# Patient Record
Sex: FEMALE | Race: OTHER | NOT HISPANIC OR LATINO
[De-identification: names, ages, dates, MRNs, and addresses within clinical notes are randomized per-mention and may not be internally consistent; named-entity substitution may affect disease eponyms.]

---

## 2018-04-17 ENCOUNTER — TRANSCRIPTION ENCOUNTER (OUTPATIENT)
Age: 73
End: 2018-04-17

## 2018-06-18 ENCOUNTER — APPOINTMENT (OUTPATIENT)
Dept: ORTHOPEDIC SURGERY | Facility: CLINIC | Age: 73
End: 2018-06-18
Payer: MEDICARE

## 2018-06-18 VITALS
HEIGHT: 62 IN | SYSTOLIC BLOOD PRESSURE: 150 MMHG | DIASTOLIC BLOOD PRESSURE: 82 MMHG | HEART RATE: 76 BPM | BODY MASS INDEX: 34.23 KG/M2 | WEIGHT: 186 LBS

## 2018-06-18 DIAGNOSIS — Z87.39 PERSONAL HISTORY OF OTHER DISEASES OF THE MUSCULOSKELETAL SYSTEM AND CONNECTIVE TISSUE: ICD-10-CM

## 2018-06-18 DIAGNOSIS — M47.812 SPONDYLOSIS W/OUT MYELOPATHY OR RADICULOPATHY, CERVICAL REGION: ICD-10-CM

## 2018-06-18 DIAGNOSIS — Z87.891 PERSONAL HISTORY OF NICOTINE DEPENDENCE: ICD-10-CM

## 2018-06-18 PROCEDURE — 99204 OFFICE O/P NEW MOD 45 MIN: CPT

## 2018-09-07 ENCOUNTER — EMERGENCY (EMERGENCY)
Facility: HOSPITAL | Age: 73
LOS: 1 days | Discharge: HOME | End: 2018-09-07
Attending: EMERGENCY MEDICINE
Payer: MEDICARE

## 2018-09-07 VITALS
HEART RATE: 71 BPM | DIASTOLIC BLOOD PRESSURE: 68 MMHG | RESPIRATION RATE: 16 BRPM | TEMPERATURE: 98 F | OXYGEN SATURATION: 99 % | WEIGHT: 184.09 LBS | SYSTOLIC BLOOD PRESSURE: 130 MMHG

## 2018-09-07 PROCEDURE — 73630 X-RAY EXAM OF FOOT: CPT

## 2018-09-07 PROCEDURE — 99284 EMERGENCY DEPT VISIT MOD MDM: CPT | Mod: 25

## 2018-09-07 PROCEDURE — 73562 X-RAY EXAM OF KNEE 3: CPT

## 2018-09-07 PROCEDURE — 73562 X-RAY EXAM OF KNEE 3: CPT | Mod: 26,RT

## 2018-09-07 PROCEDURE — 29515 APPLICATION SHORT LEG SPLINT: CPT | Mod: RT

## 2018-09-07 PROCEDURE — 29515 APPLICATION SHORT LEG SPLINT: CPT

## 2018-09-07 PROCEDURE — 73630 X-RAY EXAM OF FOOT: CPT | Mod: 26,RT

## 2018-09-07 PROCEDURE — 73590 X-RAY EXAM OF LOWER LEG: CPT | Mod: 26,RT

## 2018-09-07 PROCEDURE — 73610 X-RAY EXAM OF ANKLE: CPT | Mod: 26,RT

## 2018-09-07 PROCEDURE — 73590 X-RAY EXAM OF LOWER LEG: CPT

## 2018-09-07 PROCEDURE — 73610 X-RAY EXAM OF ANKLE: CPT

## 2018-09-07 PROCEDURE — 99283 EMERGENCY DEPT VISIT LOW MDM: CPT | Mod: 25

## 2018-09-07 RX ORDER — OXYCODONE AND ACETAMINOPHEN 5; 325 MG/1; MG/1
1 TABLET ORAL ONCE
Qty: 0 | Refills: 0 | Status: DISCONTINUED | OUTPATIENT
Start: 2018-09-07 | End: 2018-09-07

## 2018-09-07 RX ADMIN — OXYCODONE AND ACETAMINOPHEN 1 TABLET(S): 5; 325 TABLET ORAL at 16:08

## 2018-09-07 NOTE — ED PROVIDER NOTE - CARE PLAN
Principal Discharge DX:	Fibula fracture  Assessment and plan of treatment:	Return to the ED for any new or worsening symptoms  Take your medication as prescribed  Motrin per label instructions as needed for pain   Percocet per label instructions as needed for severe pain do not drive when taking   Keep splint in place until cleared by orthopedics   Elevate leg to reduce swelling   Non weight bearing until cleared by orthopedics   Ice to affected ankle on 20 min off 40 min as needed for pain and swelling   Call the orthopedist on Monday to schedule follow up  Secondary Diagnosis:	Calcaneal spur  Secondary Diagnosis:	Ankle pain, right

## 2018-09-07 NOTE — ED PROVIDER NOTE - OBJECTIVE STATEMENT
Pt is a 74 yo female who presents to the ED with a cc of right foot pain.  PMHx of HTN, HLD.   Pt reports that today while walking outside she suffered a mechanical fall twisting her right ankle and landing on her right knee.  Pt denies striking her head and denies LOC.  She is not on blood thinners.  Was able to stand and bear weight but with severe pain to her right ankle.  Denies numbness or tingling in her RLE.  Pt does report previous right ankle fracture.  Denies HA, visual changes, N/V, CP, SOB, abd pain.  Denies neck and back pain.  Denies loss of bowel or bladder function.

## 2018-09-07 NOTE — ED ADULT NURSE NOTE - NSIMPLEMENTINTERV_GEN_ALL_ED
Implemented All Universal Safety Interventions:  [place] to call system. Call bell, personal items and telephone within reach. Instruct patient to call for assistance. Room bathroom lighting operational. Non-slip footwear when patient is off stretcher. Physically safe environment: no spills, clutter or unnecessary equipment. Stretcher in lowest position, wheels locked, appropriate side rails in place. Implemented All Fall Risk Interventions:  [place] to call system. Call bell, personal items and telephone within reach. Instruct patient to call for assistance. Room bathroom lighting operational. Non-slip footwear when patient is off stretcher. Physically safe environment: no spills, clutter or unnecessary equipment. Stretcher in lowest position, wheels locked, appropriate side rails in place. Provide visual cue, wrist band, yellow gown, etc. Monitor gait and stability. Monitor for mental status changes and reorient to person, place, and time. Review medications for side effects contributing to fall risk. Reinforce activity limits and safety measures with patient and family.

## 2018-09-07 NOTE — ED PROVIDER NOTE - PROGRESS NOTE DETAILS
Results of images reviewed, copy provided, all questions answered.  Pt to follow up with orthopedics as an outpatient

## 2018-09-07 NOTE — ED PROVIDER NOTE - PLAN OF CARE
Return to the ED for any new or worsening symptoms  Take your medication as prescribed  Motrin per label instructions as needed for pain   Percocet per label instructions as needed for severe pain do not drive when taking   Keep splint in place until cleared by orthopedics   Elevate leg to reduce swelling   Non weight bearing until cleared by orthopedics   Ice to affected ankle on 20 min off 40 min as needed for pain and swelling   Call the orthopedist on Monday to schedule follow up

## 2018-09-07 NOTE — ED ADULT NURSE NOTE - OBJECTIVE STATEMENT
Pt is 73y F, came to ED s/p mechanical fall today, she states she tripped over steps and injured her Right knee and Ankle, denies any numbness/tingling, no deformity noted, +pedal pulses noted, pt denies hitting head, has no other complaints, advised on plan of care, verbalized understanding, she reports pain on weight bearing to the Right LE.

## 2018-09-07 NOTE — ED PROVIDER NOTE - MUSCULOSKELETAL, MLM
Diffuse TTP to right ankle with swelling noted to medial and lateral malleolus no TTP to MTPs, +pedal pulse, cap refill less then 2 seconds, no TTP to right knee, FROM sensation intact, no significant ligament instability noted, sensation grossly intact to leg

## 2020-04-29 ENCOUNTER — TRANSCRIPTION ENCOUNTER (OUTPATIENT)
Age: 75
End: 2020-04-29

## 2021-02-25 PROBLEM — I10 ESSENTIAL (PRIMARY) HYPERTENSION: Chronic | Status: ACTIVE | Noted: 2018-09-07

## 2021-02-25 PROBLEM — E78.5 HYPERLIPIDEMIA, UNSPECIFIED: Chronic | Status: ACTIVE | Noted: 2018-09-07

## 2021-04-27 ENCOUNTER — APPOINTMENT (OUTPATIENT)
Dept: OBGYN | Facility: CLINIC | Age: 76
End: 2021-04-27
Payer: MEDICARE

## 2021-04-27 VITALS
HEIGHT: 62 IN | BODY MASS INDEX: 34.6 KG/M2 | WEIGHT: 188 LBS | SYSTOLIC BLOOD PRESSURE: 126 MMHG | DIASTOLIC BLOOD PRESSURE: 82 MMHG

## 2021-04-27 PROCEDURE — 77080 DXA BONE DENSITY AXIAL: CPT

## 2021-04-27 PROCEDURE — 99214 OFFICE O/P EST MOD 30 MIN: CPT | Mod: 25

## 2021-04-27 PROCEDURE — G0101: CPT

## 2021-04-27 PROCEDURE — 82274 ASSAY TEST FOR BLOOD FECAL: CPT | Mod: QW

## 2021-04-28 LAB — HPV HIGH+LOW RISK DNA PNL CVX: NOT DETECTED

## 2021-05-02 LAB — CYTOLOGY CVX/VAG DOC THIN PREP: ABNORMAL

## 2021-05-21 ENCOUNTER — RESULT REVIEW (OUTPATIENT)
Age: 76
End: 2021-05-21

## 2021-05-21 ENCOUNTER — APPOINTMENT (OUTPATIENT)
Dept: ULTRASOUND IMAGING | Facility: CLINIC | Age: 76
End: 2021-05-21
Payer: MEDICARE

## 2021-05-21 ENCOUNTER — APPOINTMENT (OUTPATIENT)
Dept: MAMMOGRAPHY | Facility: CLINIC | Age: 76
End: 2021-05-21
Payer: MEDICARE

## 2021-05-21 PROCEDURE — 77063 BREAST TOMOSYNTHESIS BI: CPT

## 2021-05-21 PROCEDURE — 76641 ULTRASOUND BREAST COMPLETE: CPT | Mod: 50

## 2021-05-21 PROCEDURE — 77067 SCR MAMMO BI INCL CAD: CPT

## 2021-05-31 ENCOUNTER — APPOINTMENT (OUTPATIENT)
Dept: MAMMOGRAPHY | Facility: CLINIC | Age: 76
End: 2021-05-31
Payer: MEDICARE

## 2021-05-31 ENCOUNTER — RESULT REVIEW (OUTPATIENT)
Age: 76
End: 2021-05-31

## 2021-05-31 ENCOUNTER — APPOINTMENT (OUTPATIENT)
Dept: ULTRASOUND IMAGING | Facility: CLINIC | Age: 76
End: 2021-05-31
Payer: MEDICARE

## 2021-05-31 PROCEDURE — 76642 ULTRASOUND BREAST LIMITED: CPT | Mod: LT

## 2021-05-31 PROCEDURE — G0279: CPT | Mod: LT

## 2021-05-31 PROCEDURE — 77065 DX MAMMO INCL CAD UNI: CPT | Mod: LT

## 2021-06-08 DIAGNOSIS — N64.59 OTHER SIGNS AND SYMPTOMS IN BREAST: ICD-10-CM

## 2021-11-16 ENCOUNTER — RESULT REVIEW (OUTPATIENT)
Age: 76
End: 2021-11-16

## 2021-11-16 ENCOUNTER — APPOINTMENT (OUTPATIENT)
Dept: ULTRASOUND IMAGING | Facility: CLINIC | Age: 76
End: 2021-11-16

## 2021-11-16 ENCOUNTER — APPOINTMENT (OUTPATIENT)
Dept: MAMMOGRAPHY | Facility: CLINIC | Age: 76
End: 2021-11-16
Payer: MEDICARE

## 2021-11-16 PROCEDURE — 76642 ULTRASOUND BREAST LIMITED: CPT | Mod: LT

## 2021-11-16 PROCEDURE — 77065 DX MAMMO INCL CAD UNI: CPT | Mod: LT

## 2021-11-16 PROCEDURE — G0279: CPT

## 2021-11-17 ENCOUNTER — NON-APPOINTMENT (OUTPATIENT)
Age: 76
End: 2021-11-17

## 2021-11-17 ENCOUNTER — RESULT REVIEW (OUTPATIENT)
Age: 76
End: 2021-11-17

## 2021-11-19 ENCOUNTER — APPOINTMENT (OUTPATIENT)
Dept: ULTRASOUND IMAGING | Facility: CLINIC | Age: 76
End: 2021-11-19
Payer: MEDICARE

## 2021-11-19 ENCOUNTER — RESULT REVIEW (OUTPATIENT)
Age: 76
End: 2021-11-19

## 2021-11-19 ENCOUNTER — APPOINTMENT (OUTPATIENT)
Dept: MAMMOGRAPHY | Facility: CLINIC | Age: 76
End: 2021-11-19
Payer: MEDICARE

## 2021-11-19 PROCEDURE — 19083 BX BREAST 1ST LESION US IMAG: CPT | Mod: LT

## 2021-11-19 PROCEDURE — 77065 DX MAMMO INCL CAD UNI: CPT | Mod: LT

## 2021-11-24 ENCOUNTER — NON-APPOINTMENT (OUTPATIENT)
Age: 76
End: 2021-11-24

## 2021-11-29 ENCOUNTER — NON-APPOINTMENT (OUTPATIENT)
Age: 76
End: 2021-11-29

## 2021-11-30 ENCOUNTER — NON-APPOINTMENT (OUTPATIENT)
Age: 76
End: 2021-11-30

## 2021-11-30 ENCOUNTER — APPOINTMENT (OUTPATIENT)
Dept: SURGICAL ONCOLOGY | Facility: CLINIC | Age: 76
End: 2021-11-30
Payer: MEDICARE

## 2021-11-30 VITALS
TEMPERATURE: 97.6 F | HEIGHT: 62 IN | RESPIRATION RATE: 15 BRPM | SYSTOLIC BLOOD PRESSURE: 138 MMHG | DIASTOLIC BLOOD PRESSURE: 87 MMHG | HEART RATE: 78 BPM | BODY MASS INDEX: 33.49 KG/M2 | WEIGHT: 182 LBS | OXYGEN SATURATION: 95 %

## 2021-11-30 PROCEDURE — 99204 OFFICE O/P NEW MOD 45 MIN: CPT

## 2021-11-30 NOTE — PHYSICAL EXAM
[Normal] : supple, no neck mass and thyroid not enlarged [Normal Supraclavicular Lymph Nodes] : normal supraclavicular lymph nodes [Normal Axillary Lymph Nodes] : normal axillary lymph nodes [Normal] : oriented to person, place and time, with appropriate affect [FreeTextEntry1] : AB present during exam  [de-identified] : Normal S1, S2.  Regular rate and rhythm.  [de-identified] : Complete breast exam performed in supine and upright positions.  No palpable masses, tenderness, nipple discharge, inversion, deviation or enlarged axillary or supraclavicular lymph nodes bilaterally.  [de-identified] : Clear breath sounds bilaterally, normal respiratory effort.

## 2021-11-30 NOTE — HISTORY OF PRESENT ILLNESS
[de-identified] : 76 year female presents for an established patient consultation.  She has a prior history of a benign excisional biopsy of the left breast in 2013, right breast in 1999 and an additional excisional biopsy with another surgeon at an unspecified time.  Pathology demonstrated atypia but she has never been diagnosed with cancer.  \par \par She completed an annual mammogram and sonogram in May 2021.  A small asymmetry was seen in the left breast for which additional views are recommended.  There are questionable small nodules in the left breast on ultrasound for which a targeted ultrasound was recommended (BIRADS 0).  Subsequent left-sided mammogram and sonogram performed 5/31/21 revealed a persistent small nodular asymmetry in the left breast on mammogram likely corresponding to a probable cyst on ultrasound.  Follow up diagnostic left breast mammogram and sonogram in 6 months are recommended.  There is an additional small nodule in the upper left breast on mammogram with no sonogram correlate- 6 month follow up diagnostic mammogram recommended.  There are probably benign findings on ultrasound in the left breast for which 6 month follow up left breast ultrasound is recommended (BIRADS 3). \par \par Left-sided mammogram and sonogram was performed in November 2021.  There is a 3 mm cyst vs.hypoechoic nodule in the left breast at 9:00, 6 cmfn, corresponding to a stable circumscribed nodule on mammogram.  This appears somewhat irregular on sonography and ultrasound-guided biopsy is recommended (BIRADS 4b).\par \par Biopsy was performed on 11/19/21.  Pathology demonstrated invasive ductal carcinoma, ER/MA/HER2 status pending.\par \par Her past medical history includes hypertension, hypothyroidism and hyperlipidemia.  She is currently undergoing a work up with endocrinology because she believes she is having hormonal issues.  Past surgical history includes a bilateral oophorectomy for a benign condition) and a D&C.  She has a family history of breast cancer involving a maternal first cousin at age 62.  \par \par She denies any palpable breast mass, nipple discharge, nipple inversion or skin changes. \par \par Ob/gyn: Dr. Tia Mckeon

## 2021-11-30 NOTE — ASSESSMENT
[FreeTextEntry1] : IMP:\par Newly diagnosed invasive ductal carcinoma of the left breast at 9:00, 6 cmfn (ER/AR/HER2 pending).\par \par PLAN:\par The patient and I discussed the surgical management of breast cancer. I explained that breast cancer can be treated with 2 main surgical approaches. One is breast conserving therapy. The other is mastectomy with or without immediate reconstruction by a plastic surgeon. Breast conserving therapy involves wide excision of the involved area. Negative margins must be achieved with this wide excision. In addition, evaluation of the lymph nodes either with a sentinel lymph node biopsy or an axillary lymph node dissection may be required. This treatment usually requires postoperative radiation to the breast. Treatment with mastectomy also involves evaluation of the lymph node with a sentinel lymph node biopsy or axillary lymph node dissection. Post operative radiation therapy may be needed even after mastectomy in certain advanced cases.\par \par Breast MRI\par

## 2021-12-02 ENCOUNTER — OUTPATIENT (OUTPATIENT)
Dept: OUTPATIENT SERVICES | Facility: HOSPITAL | Age: 76
LOS: 1 days | End: 2021-12-02
Payer: MEDICARE

## 2021-12-02 ENCOUNTER — APPOINTMENT (OUTPATIENT)
Dept: MRI IMAGING | Facility: IMAGING CENTER | Age: 76
End: 2021-12-02
Payer: MEDICARE

## 2021-12-02 DIAGNOSIS — C50.912 MALIGNANT NEOPLASM OF UNSPECIFIED SITE OF LEFT FEMALE BREAST: ICD-10-CM

## 2021-12-02 PROCEDURE — G1004: CPT

## 2021-12-02 PROCEDURE — 77049 MRI BREAST C-+ W/CAD BI: CPT | Mod: 26,MG

## 2021-12-02 PROCEDURE — C8908: CPT | Mod: MG

## 2021-12-02 PROCEDURE — A9585: CPT

## 2021-12-02 PROCEDURE — C8937: CPT

## 2021-12-09 ENCOUNTER — APPOINTMENT (OUTPATIENT)
Dept: ULTRASOUND IMAGING | Facility: CLINIC | Age: 76
End: 2021-12-09
Payer: MEDICARE

## 2021-12-09 ENCOUNTER — OUTPATIENT (OUTPATIENT)
Dept: OUTPATIENT SERVICES | Facility: HOSPITAL | Age: 76
LOS: 1 days | End: 2021-12-09
Payer: MEDICARE

## 2021-12-09 ENCOUNTER — RESULT REVIEW (OUTPATIENT)
Age: 76
End: 2021-12-09

## 2021-12-09 DIAGNOSIS — N64.59 OTHER SIGNS AND SYMPTOMS IN BREAST: ICD-10-CM

## 2021-12-09 PROCEDURE — 76641 ULTRASOUND BREAST COMPLETE: CPT | Mod: 26,50

## 2021-12-09 PROCEDURE — 19084 BX BREAST ADD LESION US IMAG: CPT | Mod: RT

## 2021-12-09 PROCEDURE — 76641 ULTRASOUND BREAST COMPLETE: CPT

## 2021-12-09 PROCEDURE — 77066 DX MAMMO INCL CAD BI: CPT | Mod: 26

## 2021-12-09 PROCEDURE — A4648: CPT

## 2021-12-09 PROCEDURE — 19083 BX BREAST 1ST LESION US IMAG: CPT | Mod: LT

## 2021-12-09 PROCEDURE — 88305 TISSUE EXAM BY PATHOLOGIST: CPT | Mod: 26

## 2021-12-09 PROCEDURE — 19083 BX BREAST 1ST LESION US IMAG: CPT

## 2021-12-09 PROCEDURE — 19084 BX BREAST ADD LESION US IMAG: CPT

## 2021-12-09 PROCEDURE — 77066 DX MAMMO INCL CAD BI: CPT

## 2021-12-09 PROCEDURE — 88305 TISSUE EXAM BY PATHOLOGIST: CPT

## 2021-12-14 LAB — SURGICAL PATHOLOGY STUDY: SIGNIFICANT CHANGE UP

## 2021-12-15 ENCOUNTER — APPOINTMENT (OUTPATIENT)
Dept: ULTRASOUND IMAGING | Facility: HOSPITAL | Age: 76
End: 2021-12-15

## 2022-01-03 ENCOUNTER — OUTPATIENT (OUTPATIENT)
Dept: OUTPATIENT SERVICES | Facility: HOSPITAL | Age: 77
LOS: 1 days | End: 2022-01-03
Payer: MEDICARE

## 2022-01-03 VITALS
HEIGHT: 62.5 IN | RESPIRATION RATE: 16 BRPM | OXYGEN SATURATION: 97 % | SYSTOLIC BLOOD PRESSURE: 133 MMHG | HEART RATE: 77 BPM | WEIGHT: 186.07 LBS | DIASTOLIC BLOOD PRESSURE: 85 MMHG | TEMPERATURE: 97 F

## 2022-01-03 DIAGNOSIS — I10 ESSENTIAL (PRIMARY) HYPERTENSION: ICD-10-CM

## 2022-01-03 DIAGNOSIS — C50.912 MALIGNANT NEOPLASM OF UNSPECIFIED SITE OF LEFT FEMALE BREAST: ICD-10-CM

## 2022-01-03 DIAGNOSIS — C50.919 MALIGNANT NEOPLASM OF UNSPECIFIED SITE OF UNSPECIFIED FEMALE BREAST: ICD-10-CM

## 2022-01-03 LAB
ALBUMIN SERPL ELPH-MCNC: 4.3 G/DL — SIGNIFICANT CHANGE UP (ref 3.3–5)
ALP SERPL-CCNC: 49 U/L — SIGNIFICANT CHANGE UP (ref 40–120)
ALT FLD-CCNC: 19 U/L — SIGNIFICANT CHANGE UP (ref 4–33)
ANION GAP SERPL CALC-SCNC: 10 MMOL/L — SIGNIFICANT CHANGE UP (ref 7–14)
AST SERPL-CCNC: 17 U/L — SIGNIFICANT CHANGE UP (ref 4–32)
BILIRUB SERPL-MCNC: 0.2 MG/DL — SIGNIFICANT CHANGE UP (ref 0.2–1.2)
BUN SERPL-MCNC: 30 MG/DL — HIGH (ref 7–23)
CALCIUM SERPL-MCNC: 9.1 MG/DL — SIGNIFICANT CHANGE UP (ref 8.4–10.5)
CHLORIDE SERPL-SCNC: 103 MMOL/L — SIGNIFICANT CHANGE UP (ref 98–107)
CO2 SERPL-SCNC: 26 MMOL/L — SIGNIFICANT CHANGE UP (ref 22–31)
CREAT SERPL-MCNC: 0.8 MG/DL — SIGNIFICANT CHANGE UP (ref 0.5–1.3)
GLUCOSE SERPL-MCNC: 76 MG/DL — SIGNIFICANT CHANGE UP (ref 70–99)
HCT VFR BLD CALC: 38.1 % — SIGNIFICANT CHANGE UP (ref 34.5–45)
HGB BLD-MCNC: 11.8 G/DL — SIGNIFICANT CHANGE UP (ref 11.5–15.5)
MCHC RBC-ENTMCNC: 28.5 PG — SIGNIFICANT CHANGE UP (ref 27–34)
MCHC RBC-ENTMCNC: 31 GM/DL — LOW (ref 32–36)
MCV RBC AUTO: 92 FL — SIGNIFICANT CHANGE UP (ref 80–100)
NRBC # BLD: 0 /100 WBCS — SIGNIFICANT CHANGE UP
NRBC # FLD: 0 K/UL — SIGNIFICANT CHANGE UP
PLATELET # BLD AUTO: 302 K/UL — SIGNIFICANT CHANGE UP (ref 150–400)
POTASSIUM SERPL-MCNC: 4.3 MMOL/L — SIGNIFICANT CHANGE UP (ref 3.5–5.3)
POTASSIUM SERPL-SCNC: 4.3 MMOL/L — SIGNIFICANT CHANGE UP (ref 3.5–5.3)
PROT SERPL-MCNC: 7 G/DL — SIGNIFICANT CHANGE UP (ref 6–8.3)
RBC # BLD: 4.14 M/UL — SIGNIFICANT CHANGE UP (ref 3.8–5.2)
RBC # FLD: 13.5 % — SIGNIFICANT CHANGE UP (ref 10.3–14.5)
SODIUM SERPL-SCNC: 139 MMOL/L — SIGNIFICANT CHANGE UP (ref 135–145)
WBC # BLD: 6.2 K/UL — SIGNIFICANT CHANGE UP (ref 3.8–10.5)
WBC # FLD AUTO: 6.2 K/UL — SIGNIFICANT CHANGE UP (ref 3.8–10.5)

## 2022-01-03 PROCEDURE — 93010 ELECTROCARDIOGRAM REPORT: CPT

## 2022-01-03 RX ORDER — GABAPENTIN 400 MG/1
0 CAPSULE ORAL
Qty: 0 | Refills: 0 | DISCHARGE

## 2022-01-03 RX ORDER — ATORVASTATIN CALCIUM 80 MG/1
0 TABLET, FILM COATED ORAL
Qty: 0 | Refills: 0 | DISCHARGE

## 2022-01-03 NOTE — H&P PST ADULT - ASSESSMENT
77 y/o female with hx of left breast abnormality discovered on imaging, malignant on  biopsy.  scheduled forLeft breast lumpectomy, post magseed localization left axillary sentinel lymph node biopsy.

## 2022-01-03 NOTE — H&P PST ADULT - PROBLEM SELECTOR PLAN 1
Left breast lumpectomy, post magseed localization left axillary sentinel lymph node biopsy.    CBC CMP EKG    Preop instructions and antibacterial soap given and explained (verbal and written), with teach back.    Pt to see PMD for pre-op eval (comorbidities, age), requested on chart

## 2022-01-03 NOTE — H&P PST ADULT - HISTORY OF PRESENT ILLNESS
75 y/o female with hx of left breast abnormality discovered on imaging, malignant on  biopsy.  scheduled forLeft breast lumpectomy, post magseed localization left axillary sentinel lymph node biopsy.

## 2022-01-03 NOTE — H&P PST ADULT - NSICDXPASTMEDICALHX_GEN_ALL_CORE_FT
PAST MEDICAL HISTORY:  Hyperlipidemia     Hypertension     Hypothyroidism      PAST MEDICAL HISTORY:  Hyperlipidemia     Hypertension     Hypothyroidism     Obesity

## 2022-01-04 PROBLEM — E03.9 HYPOTHYROIDISM, UNSPECIFIED: Chronic | Status: ACTIVE | Noted: 2022-01-03

## 2022-01-04 PROBLEM — E66.9 OBESITY, UNSPECIFIED: Chronic | Status: ACTIVE | Noted: 2022-01-03

## 2022-01-11 ENCOUNTER — RESULT REVIEW (OUTPATIENT)
Age: 77
End: 2022-01-11

## 2022-01-11 ENCOUNTER — OUTPATIENT (OUTPATIENT)
Dept: OUTPATIENT SERVICES | Facility: HOSPITAL | Age: 77
LOS: 1 days | End: 2022-01-11
Payer: MEDICARE

## 2022-01-11 ENCOUNTER — APPOINTMENT (OUTPATIENT)
Dept: MAMMOGRAPHY | Facility: IMAGING CENTER | Age: 77
End: 2022-01-11
Payer: MEDICARE

## 2022-01-11 DIAGNOSIS — Z00.8 ENCOUNTER FOR OTHER GENERAL EXAMINATION: ICD-10-CM

## 2022-01-11 PROCEDURE — 19281 PERQ DEVICE BREAST 1ST IMAG: CPT | Mod: LT

## 2022-01-11 PROCEDURE — 19281 PERQ DEVICE BREAST 1ST IMAG: CPT

## 2022-01-11 PROCEDURE — C1739: CPT

## 2022-01-12 ENCOUNTER — TRANSCRIPTION ENCOUNTER (OUTPATIENT)
Age: 77
End: 2022-01-12

## 2022-01-12 VITALS
RESPIRATION RATE: 16 BRPM | HEIGHT: 62.5 IN | TEMPERATURE: 97 F | WEIGHT: 186.07 LBS | HEART RATE: 65 BPM | DIASTOLIC BLOOD PRESSURE: 83 MMHG | SYSTOLIC BLOOD PRESSURE: 123 MMHG | OXYGEN SATURATION: 97 %

## 2022-01-12 NOTE — ASU PREOPERATIVE ASSESSMENT, ADULT ([place] ONLY) - FALL HARM RISK - UNIVERSAL INTERVENTIONS
Bed in lowest position, wheels locked, appropriate side rails in place/Call bell, personal items and telephone in reach/Instruct patient to call for assistance before getting out of bed or chair/Non-slip footwear when patient is out of bed/[place] to call system/Physically safe environment - no spills, clutter or unnecessary equipment/Purposeful Proactive Rounding/Room/bathroom lighting operational, light cord in reach

## 2022-01-13 ENCOUNTER — TRANSCRIPTION ENCOUNTER (OUTPATIENT)
Age: 77
End: 2022-01-13

## 2022-01-13 ENCOUNTER — OUTPATIENT (OUTPATIENT)
Dept: OUTPATIENT SERVICES | Facility: HOSPITAL | Age: 77
LOS: 1 days | End: 2022-01-13
Payer: COMMERCIAL

## 2022-01-13 ENCOUNTER — NON-APPOINTMENT (OUTPATIENT)
Age: 77
End: 2022-01-13

## 2022-01-13 ENCOUNTER — RESULT REVIEW (OUTPATIENT)
Age: 77
End: 2022-01-13

## 2022-01-13 ENCOUNTER — APPOINTMENT (OUTPATIENT)
Dept: SURGICAL ONCOLOGY | Facility: AMBULATORY SURGERY CENTER | Age: 77
End: 2022-01-13

## 2022-01-13 ENCOUNTER — OUTPATIENT (OUTPATIENT)
Dept: OUTPATIENT SERVICES | Facility: HOSPITAL | Age: 77
LOS: 1 days | Discharge: HOME | End: 2022-01-13
Payer: MEDICARE

## 2022-01-13 ENCOUNTER — APPOINTMENT (OUTPATIENT)
Dept: NUCLEAR MEDICINE | Facility: IMAGING CENTER | Age: 77
End: 2022-01-13
Payer: MEDICARE

## 2022-01-13 ENCOUNTER — APPOINTMENT (OUTPATIENT)
Dept: MAMMOGRAPHY | Facility: IMAGING CENTER | Age: 77
End: 2022-01-13
Payer: MEDICARE

## 2022-01-13 VITALS
HEART RATE: 88 BPM | SYSTOLIC BLOOD PRESSURE: 129 MMHG | RESPIRATION RATE: 16 BRPM | DIASTOLIC BLOOD PRESSURE: 63 MMHG | TEMPERATURE: 98 F | OXYGEN SATURATION: 100 %

## 2022-01-13 DIAGNOSIS — C50.912 MALIGNANT NEOPLASM OF UNSPECIFIED SITE OF LEFT FEMALE BREAST: ICD-10-CM

## 2022-01-13 DIAGNOSIS — Z00.8 ENCOUNTER FOR OTHER GENERAL EXAMINATION: ICD-10-CM

## 2022-01-13 PROCEDURE — 76098 X-RAY EXAM SURGICAL SPECIMEN: CPT | Mod: 26

## 2022-01-13 PROCEDURE — 88305 TISSUE EXAM BY PATHOLOGIST: CPT | Mod: 26

## 2022-01-13 PROCEDURE — 76098 X-RAY EXAM SURGICAL SPECIMEN: CPT

## 2022-01-13 PROCEDURE — 88307 TISSUE EXAM BY PATHOLOGIST: CPT | Mod: 26

## 2022-01-13 PROCEDURE — 38790 INJECT FOR LYMPHATIC X-RAY: CPT | Mod: LT

## 2022-01-13 PROCEDURE — 88342 IMHCHEM/IMCYTCHM 1ST ANTB: CPT | Mod: 26

## 2022-01-13 PROCEDURE — 19302 P-MASTECTOMY W/LN REMOVAL: CPT | Mod: LT

## 2022-01-13 PROCEDURE — 88341 IMHCHEM/IMCYTCHM EA ADD ANTB: CPT | Mod: 26

## 2022-01-13 NOTE — ASU DISCHARGE PLAN (ADULT/PEDIATRIC) - CARE PROVIDER_API CALL
Ant Becerril)  Surgery  [address] [place] [address] [id number]  Phone: [phone]  Fax: [phone]  Follow Up Time: 2 weeks

## 2022-01-13 NOTE — ASU DISCHARGE PLAN (ADULT/PEDIATRIC) - NS MD DC FALL RISK RISK
For information on Fall & Injury Prevention, visit: [email or web address] OR  [email or web address] OR  https://www.cdc.gov/steadi/patient.html

## 2022-01-13 NOTE — BRIEF OPERATIVE NOTE - OPERATION/FINDINGS
Left breast lumpectomy with mag seed localization. Specimen with mag seed and clip confirmed by radiology. Additional specimens sent. [place] lymph node biopsy using blue dye, one node removed.

## 2022-01-13 NOTE — ASU DISCHARGE PLAN (ADULT/PEDIATRIC) - ASU DC SPECIAL INSTRUCTIONSFT
[facility]  CANCER  I  N  S  T  I  T  U  T E     Department of Surgery  Division of Surgical Oncology    Rohan Anderson M.D., NICOLE.JUVENTINO.  Chief, Division of Surgical Oncology    Rishi Ivy M.D., F.A.C.S., F.A.S.JAC.  Associate , Department of Surgery    Blake Win M.D., F.A.C.S.  Mike Bradshaw M.D., F.A.C.S.  Ulysses Jones M.D., F.A.C.S.  Mauro Jacobson M.D., F.A.C.S.  Mike Torres M.D., F.A.C.S.  Ant Becerril M.D., F.MARIANC.S.      Breast Biopsy/Lumpectomy Post-operative Instructions  1.	Supportive bra- Please bring a sports/athletic bra with you on the day of your surgery.  You will wear it home from the hospital.  You should wear the supportive bra continuously for 48 hours after the procedure, including wearing it to sleep.  Therefore, you may wear your regular bra.  You may remove the bra to shower.  The sports bra will provide support, decrease the amount of swelling at the biopsy site, and make your recovery more comfortable.    2.	Wound dressing- There is a dressing on the wound, which consists of 2 layers.  The outer layer is a clear plastic dressing (called Tegaderm) which is waterproof.  This should remain in place for 2 days post-operatively.  On the 2nd post-operative day, you should remove the clear plastic Tegaderm dressing.  Underneath the Tegaderm dressing, there are white surgical tapes (called steri strips) which are directly adherent to the skin.  These should remain on the skin, and will peel off naturally.  All of the stitches are “internal” and will dissolve naturally.    3.	Showering/Bathing- You may shower over the dressing the very next day after surgery.  Allow the water to run over the dressing, but don not scrub the area.  It is best not to sit in a bathtub or swimming pool for at least one week after surgery.    4.	Activity level- You may resume most normal daily activity as tolerated, but avoid strenuous activities such as aerobics, jogging, exercising or heavy lifting for at least 1 week after surgery.  You may return to work in 1-2 days after surgery.  You may drive as long as you are not taking any prescription pain medication.    5.	Pain Medication- You may take the prescribed medication, or you may take extra-strength Tylenol as needed.  Please don't take aspirin, Motrin, Advil or any other anti-inflammatory medications, as these medications may cause bleeding or bruising.    6.	Follow-up Appointment- Please call the office to schedule your post-operative appointment which should be approximately 10 days after your surgery. Office [phone]    7.	Bruising/Bleeding/Swelling- It is normal for there to be some bruising and swelling at the breast biopsy site, and there may be some staining of blood on to the dressing.  Some discomfort at the surgical site is expected.  If your symptoms seem excessive, or if you have any question or concerns, please call the office.      Anesthesia Precautions:  For the next 12 hours do not:   •	drive a car,  •	drink alcohol, beer, or wine,   •	make important personal or business decisions  Diet:   •	Progress diet slowly unless otherwise indicated    Physician Notification  •	Any Prescription issues  •	Bleeding that does not stop  •	Persistent nausea and vomiting  •	Pain not relieved by medications  •	Fever greater than 101®F  •	Inability to tolerate liquids or foods  •	Unable to urinate after 8 hours  Discharge and Disposition  •	Discharge to home/ group home/assisted living  •	Accompanied by Family/Spouse/ Parents/ Significant Other/ Friend/ and or Caregiver    Follow Up Care:  •	In the event that you develop a complication and you are unable to reach your own physician, you may contact:  [phone] or go to the nearest Emergency Room.   •	Please call your surgeon to schedule your follow up appointment [phone]

## 2022-01-13 NOTE — BRIEF OPERATIVE NOTE - NSICDXBRIEFPROCEDURE_GEN_ALL_CORE_FT
PROCEDURES:  Lumpectomy of left breast with sentinel node biopsy 13-Jan-2022 12:58:47  Tracy Carrillo

## 2022-01-21 LAB — SURGICAL PATHOLOGY STUDY: SIGNIFICANT CHANGE UP

## 2022-01-26 ENCOUNTER — APPOINTMENT (OUTPATIENT)
Dept: SURGICAL ONCOLOGY | Facility: CLINIC | Age: 77
End: 2022-01-26
Payer: MEDICARE

## 2022-01-26 VITALS
TEMPERATURE: 97.5 F | DIASTOLIC BLOOD PRESSURE: 84 MMHG | HEART RATE: 72 BPM | HEIGHT: 62 IN | BODY MASS INDEX: 33.49 KG/M2 | WEIGHT: 182 LBS | RESPIRATION RATE: 16 BRPM | SYSTOLIC BLOOD PRESSURE: 150 MMHG | OXYGEN SATURATION: 99 %

## 2022-01-26 PROCEDURE — 99024 POSTOP FOLLOW-UP VISIT: CPT

## 2022-01-26 NOTE — ASSESSMENT
[FreeTextEntry1] : IMP:\par Newly diagnosed invasive ductal carcinoma of the left breast at 9:00, 6 cmfn \par Now s/p left breast magseed localized lumpectomy and left axillary SLNB for left breast cancer on 1/13/2022. Final pathology revealed a 5 mm invasive poorly differentiated ductal carcinoma with prominent chronic inflammatory infiltrate, 2 mm DCIS, cribriform type with high nuclear grade, complex sclerosing lesion, focal lympho-vascular invasion is identified, negative margins, 0/2 lymph nodes.  ER/LA/HER2(-).  \par Tumor stage: pT1a pN0 ; Triple negative breast cancer. \par No evidence of infection \par \par \par PLAN:\par 1) Referral to med-onc and rad-onc\par 2) B/L mammo/sono 5/2022 (ordered)\par 3) RTO 6 months

## 2022-01-26 NOTE — HISTORY OF PRESENT ILLNESS
[de-identified] : 76 year female presents for an initial post op visit, s/p left breast magseed localized lumpectomy and left axillary SLNB for left breast cancer on 1/13/2022. Final pathology revealed a 5 mm invasive poorly differentiated ductal carcinoma with prominent chronic inflammatory infiltrate, 2 mm DCIS, cribriform type with high nuclear grade, complex sclerosing lesion, focal lympho-vascular invasion is identified, negative margins, 0/2 lymph nodes.  ER/VT/HER2(-).  Tumor stage: pT1a pN0 ; Triple negative breast cancer. \par \par Reports soreness at the healing incision site but doing well. Denies fever or chills. \par \par \par PERTINENT HISTORY:\par She has a prior history of a benign excisional biopsy of the left breast in 2013, right breast in 1999 and an additional excisional biopsy with another surgeon at an unspecified time.  Pathology demonstrated atypia but she has never been diagnosed with cancer.  \par \par She completed an annual mammogram and sonogram in May 2021.  A small asymmetry was seen in the left breast for which additional views are recommended.  There are questionable small nodules in the left breast on ultrasound for which a targeted ultrasound was recommended (BIRADS 0).  Subsequent left-sided mammogram and sonogram performed 5/31/21 revealed a persistent small nodular asymmetry in the left breast on mammogram likely corresponding to a probable cyst on ultrasound.  Follow up diagnostic left breast mammogram and sonogram in 6 months are recommended.  There is an additional small nodule in the upper left breast on mammogram with no sonogram correlate- 6 month follow up diagnostic mammogram recommended.  There are probably benign findings on ultrasound in the left breast for which 6 month follow up left breast ultrasound is recommended (BIRADS 3). \par \par Left-sided mammogram and sonogram was performed in November 2021.  There is a 3 mm cyst vs.hypoechoic nodule in the left breast at 9:00, 6 cmfn, corresponding to a stable circumscribed nodule on mammogram.  This appears somewhat irregular on sonography and ultrasound-guided biopsy is recommended (BIRADS 4b).\par \par Biopsy was performed on 11/19/21.  Pathology demonstrated invasive ductal carcinoma, ER/[place]/HER2 status pending.\par \par Her past medical history includes hypertension, hypothyroidism and hyperlipidemia.  She is currently undergoing a work up with endocrinology because she believes she is having hormonal issues.  Past surgical history includes a bilateral oophorectomy for a benign condition) and a D&C.  She has a family history of breast cancer involving a maternal first cousin at age 62.  \par \par She denies any palpable breast mass, nipple discharge, nipple inversion or skin changes. \par \par Ob/gyn: Dr. Tia Mckeon

## 2022-01-26 NOTE — HISTORY OF PRESENT ILLNESS
[de-identified] : 76 year female presents for an initial post op visit, s/p left breast magseed localized lumpectomy and left axillary SLNB for left breast cancer on 1/13/2022. Final pathology revealed a 5 mm invasive poorly differentiated ductal carcinoma with prominent chronic inflammatory infiltrate, 2 mm DCIS, cribriform type with high nuclear grade, complex sclerosing lesion, focal lympho-vascular invasion is identified, negative margins, 0/2 lymph nodes.  ER/OR/HER2(-).  Tumor stage: pT1a pN0 ; Triple negative breast cancer. \par \par Reports soreness at the healing incision site but doing well. Denies fever or chills. \par \par \par PERTINENT HISTORY:\par She has a prior history of a benign excisional biopsy of the left breast in 2013, right breast in 1999 and an additional excisional biopsy with another surgeon at an unspecified time.  Pathology demonstrated atypia but she has never been diagnosed with cancer.  \par \par She completed an annual mammogram and sonogram in May 2021.  A small asymmetry was seen in the left breast for which additional views are recommended.  There are questionable small nodules in the left breast on ultrasound for which a targeted ultrasound was recommended (BIRADS 0).  Subsequent left-sided mammogram and sonogram performed 5/31/21 revealed a persistent small nodular asymmetry in the left breast on mammogram likely corresponding to a probable cyst on ultrasound.  Follow up diagnostic left breast mammogram and sonogram in 6 months are recommended.  There is an additional small nodule in the upper left breast on mammogram with no sonogram correlate- 6 month follow up diagnostic mammogram recommended.  There are probably benign findings on ultrasound in the left breast for which 6 month follow up left breast ultrasound is recommended (BIRADS 3). \par \par Left-sided mammogram and sonogram was performed in November 2021.  There is a 3 mm cyst vs.hypoechoic nodule in the left breast at 9:00, 6 cmfn, corresponding to a stable circumscribed nodule on mammogram.  This appears somewhat irregular on sonography and ultrasound-guided biopsy is recommended (BIRADS 4b).\par \par Biopsy was performed on 11/19/21.  Pathology demonstrated invasive ductal carcinoma, ER/OR/HER2 status pending.\par \par Her past medical history includes hypertension, hypothyroidism and hyperlipidemia.  She is currently undergoing a work up with endocrinology because she believes she is having hormonal issues.  Past surgical history includes a bilateral oophorectomy for a benign condition) and a D&C.  She has a family history of breast cancer involving a maternal first cousin at age 62.  \par \par She denies any palpable breast mass, nipple discharge, nipple inversion or skin changes. \par \par Ob/gyn: Dr. Tia Mckeon

## 2022-01-26 NOTE — PHYSICAL EXAM
[Normal] : well developed, well nourished, in no acute distress [FreeTextEntry1] : ANSLEY present during exam  [de-identified] : healing left breast incision with no evidence of infection  [de-identified] : healing left axillary incision with small seroma but no evidence of infection

## 2022-01-26 NOTE — ASSESSMENT
[FreeTextEntry1] : IMP:\par Newly diagnosed invasive ductal carcinoma of the left breast at 9:00, 6 cmfn \par Now s/p left breast magseed localized lumpectomy and left axillary SLNB for left breast cancer on 1/13/2022. Final pathology revealed a 5 mm invasive poorly differentiated ductal carcinoma with prominent chronic inflammatory infiltrate, 2 mm DCIS, cribriform type with high nuclear grade, complex sclerosing lesion, focal lympho-vascular invasion is identified, negative margins, 0/2 lymph nodes.  ER/[place]/HER2(-).  \par Tumor stage: pT1a pN0 ; Triple negative breast cancer. \par No evidence of infection \par \par \par PLAN:\par 1) Referral to med-onc and rad-onc\par 2) B/L mammo/sono 5/2022 (ordered)\par 3) RTO 6 months

## 2022-01-26 NOTE — PHYSICAL EXAM
[Normal] : well developed, well nourished, in no acute distress [FreeTextEntry1] : ANSLEY present during exam  [de-identified] : healing left breast incision with no evidence of infection  [de-identified] : healing left axillary incision with small seroma but no evidence of infection

## 2022-01-27 ENCOUNTER — NON-APPOINTMENT (OUTPATIENT)
Age: 77
End: 2022-01-27

## 2022-01-27 ENCOUNTER — OUTPATIENT (OUTPATIENT)
Dept: OUTPATIENT SERVICES | Facility: HOSPITAL | Age: 77
LOS: 1 days | Discharge: HOME | End: 2022-01-27

## 2022-01-27 DIAGNOSIS — D64.9 ANEMIA, UNSPECIFIED: ICD-10-CM

## 2022-01-27 RX ORDER — SIMVASTATIN 20 MG/1
20 TABLET, FILM COATED ORAL DAILY
Refills: 0 | Status: ACTIVE | COMMUNITY

## 2022-01-27 RX ORDER — LEVOTHYROXINE SODIUM 0.07 MG/1
75 TABLET ORAL DAILY
Refills: 0 | Status: ACTIVE | COMMUNITY

## 2022-01-27 RX ORDER — MULTIVITAMIN
TABLET ORAL
Refills: 0 | Status: ACTIVE | COMMUNITY

## 2022-01-27 RX ORDER — CHOLECALCIFEROL, MAGNESIUM, BORON, FOLIC ACID, PYRIDOXINE, CYANOCOBALAMIN 1342; 300; 50; 250; 1; 10; 125 MG/1; [IU]/1; MG/1; UG/1; MG/1; MG/1; UG/1
1342-1 WAFER ORAL
Refills: 0 | Status: DISCONTINUED | COMMUNITY
End: 2022-01-27

## 2022-01-27 RX ORDER — ENALAPRIL MALEATE 5 MG/1
5 TABLET ORAL DAILY
Refills: 0 | Status: ACTIVE | COMMUNITY

## 2022-01-27 RX ORDER — MULTIVIT-MIN/FOLIC/VIT K/LYCOP 400-300MCG
50 MCG TABLET ORAL
Qty: 30 | Refills: 3 | Status: ACTIVE | COMMUNITY

## 2022-01-28 ENCOUNTER — NON-APPOINTMENT (OUTPATIENT)
Age: 77
End: 2022-01-28

## 2022-01-28 ENCOUNTER — APPOINTMENT (OUTPATIENT)
Dept: HEMATOLOGY ONCOLOGY | Facility: CLINIC | Age: 77
End: 2022-01-28
Payer: MEDICARE

## 2022-01-28 VITALS
SYSTOLIC BLOOD PRESSURE: 131 MMHG | BODY MASS INDEX: 33.23 KG/M2 | HEIGHT: 62.6 IN | OXYGEN SATURATION: 99 % | WEIGHT: 185.19 LBS | HEART RATE: 72 BPM | DIASTOLIC BLOOD PRESSURE: 84 MMHG | RESPIRATION RATE: 16 BRPM | TEMPERATURE: 98.1 F

## 2022-01-28 DIAGNOSIS — Z80.0 FAMILY HISTORY OF MALIGNANT NEOPLASM OF DIGESTIVE ORGANS: ICD-10-CM

## 2022-01-28 DIAGNOSIS — Z86.39 PERSONAL HISTORY OF OTHER ENDOCRINE, NUTRITIONAL AND METABOLIC DISEASE: ICD-10-CM

## 2022-01-28 DIAGNOSIS — Z87.39 PERSONAL HISTORY OF OTHER DISEASES OF THE MUSCULOSKELETAL SYSTEM AND CONNECTIVE TISSUE: ICD-10-CM

## 2022-01-28 DIAGNOSIS — Z86.79 PERSONAL HISTORY OF OTHER DISEASES OF THE CIRCULATORY SYSTEM: ICD-10-CM

## 2022-01-28 DIAGNOSIS — Z60.2 PROBLEMS RELATED TO LIVING ALONE: ICD-10-CM

## 2022-01-28 DIAGNOSIS — Z80.3 FAMILY HISTORY OF MALIGNANT NEOPLASM OF BREAST: ICD-10-CM

## 2022-01-28 PROCEDURE — 99205 OFFICE O/P NEW HI 60 MIN: CPT

## 2022-01-28 SDOH — SOCIAL STABILITY - SOCIAL INSECURITY: PROBLEMS RELATED TO LIVING ALONE: Z60.2

## 2022-02-01 ENCOUNTER — APPOINTMENT (OUTPATIENT)
Dept: RADIATION ONCOLOGY | Facility: CLINIC | Age: 77
End: 2022-02-01
Payer: MEDICARE

## 2022-02-01 VITALS
DIASTOLIC BLOOD PRESSURE: 81 MMHG | WEIGHT: 182 LBS | TEMPERATURE: 98 F | RESPIRATION RATE: 16 BRPM | HEIGHT: 62 IN | OXYGEN SATURATION: 99 % | SYSTOLIC BLOOD PRESSURE: 122 MMHG | BODY MASS INDEX: 33.49 KG/M2 | HEART RATE: 72 BPM

## 2022-02-01 PROCEDURE — 99204 OFFICE O/P NEW MOD 45 MIN: CPT | Mod: GC,25

## 2022-02-03 NOTE — PHYSICAL EXAM
[Fully active, able to carry on all pre-disease performance without restriction] : Status 0 - Fully active, able to carry on all pre-disease performance without restriction [Normal] : bilateral breasts without nipple retraction, skin dimpling or palpable masses; the bilateral axillae are without adenopathy [de-identified] : healing lumpectomy scar

## 2022-02-03 NOTE — HISTORY OF PRESENT ILLNESS
[T: ___] : T[unfilled] [N: ___] : N[unfilled] [M: ___] : M[unfilled] [AJCC Stage: ____] : AJCC Stage: [unfilled] [de-identified] : Ms.Ruth Crowe  is a 76 year old female here for an evaluation of breast cancer. Her oncologic history is as follows:\par \par She has a prior history of a benign excisional biopsy of the left breast in 2013, right breast in 1999 and an additional excisional biopsy with another surgeon at an unspecified time. Pathology demonstrated atypia but she has never been diagnosed with cancer. \par \par She underwent routine breast imaging on 5/21/2021(BIRADS 0) which showed  small asymmetry was seen in the central left breast for which additional views are recommended. There are questionable small nodules at the 3:00, 5:00 and 11:00 positions of the left breast on ultrasound for which a targeted ultrasound was recommended  \par Additional left breast mammo/sono on 5/31/21( BIRADS 3) revealed persistent small nodular asymmetry in the central posterior likely medial left breast by mammography, likely corresponding to a probable cyst at 10:00 on sonography. Follow-up left diagnostic mammography and targeted left breast sonography in 6 months are recommended to confirm stability of these findings.There is an additional small nodule in the upper left breast on mammo, with no sonogram correlate. for which 6 months follow-up left diagnostic mammography is recommended. There are probably benign findings on US at the 5:00 and 11:00 left breast for which follow-up targeted left breast sonography in 6 months is recommended.\par Left-sided mammogram and sonogram was performed in 11/17/2021( BI-RADS 4B) There is a 3 mm cyst vs.hypoechoic nodule in the left breast at 9:00, 6 cmfn, corresponding to a stable circumscribed nodule on mammogram. This appears somewhat irregular on sonography and ultrasound-guided biopsy is recommended.\par \par She underwent  left breast, 9 o 'clock, ultrasound guided core biopsy core biopsy on 11/19/2021 which showed Invasive poorly differentiated ductal carcinoma with prominent\par lymphocytic infiltrate,Adrian score 8/9, measuring 0.3 cm, ER:Negative, 0%, PgR:Negative, 0%, HER-2:Negative. No Ductal carcinoma in situ or microcalcifications present\par No lymphovascular permeation seen.\par She underwent a breast MRI on  12/02/2021(BI-RADS 4A) which showed recently diagnosed left breast malignancy, a non-mass enhancement in the left outer retroareolar breast, possibly corresponding to a finding seen on ultrasound at 3:00 retroareolar for which targeted left breast ultrasound and ultrasound-guided core needle biopsy is recommended.In addition, in view of the multiple bilateral enhancing foci and the presence of multiple masses on previous ultrasound studies, bilateral ultrasound is recommended at this time, to determine if any other masses in either breast demonstrate indeterminate or suspicious morphology warranting biopsy.\par \par She had BL US on  12/09/2021(BIRADS 6) which showed a left breast 9:00 6 cm from the nipple 0.3 cm irregular shaped hypoechoic mass at site of biopsy-proven invasive cancer. There is a left breast 3:00 retroareolar bilobed hypoechoic nodule/complicated cyst which correlates with the MRI finding for which ultrasound-guided core biopsy is rec.\par There is a right breast 12:00 1 cm from the nipple 0.3 cm round irregular shaped hypoechoic mass with indistinct margins. for which for ultrasound-guided core biopsy is rec.\par \par  She underwent bilateral breasts us core biopsy on 12/9/2021 the right breast 12 o'clock 1 cmfn biopsy revealed proliferative fibrocystic changes with focal moderate to florid usual ductal epithelial hyperplasia,duct ectasia showing two ducts with attenuated epithelial lining,thin fibrotic walls and surrounding mild chronic inflammation The left breast, retroareolar biopsy at 3:00 revealed proliferative fibrocystic changes with moderate to florid usual ductal epithelial hyperplasia, cyst formation and apocrine metaplasia.\par \par She underwent left breast lumpectomy on  1/13/2022 which revealed invasive poorly differentiated ductal carcinoma with prominent chronic inflammatory infiltrate,  [place] grade 3, measuring 0.5 cm, Margin were negative. Small focus of Lymphovascular invasion was noted. 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 She is [employer].\par \par

## 2022-02-03 NOTE — REASON FOR VISIT
[Initial Consultation] : an initial consultation [Other: _____] : [unfilled] [FreeTextEntry2] : invasive poorly differentiated ductal carcinoma of the left breast

## 2022-02-03 NOTE — ASSESSMENT
[FreeTextEntry1] : \par Ms. 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 She is s/p lumpectomy on 1/13/2022. Tumor size is 5 mm, 2 sentinel lymph nodes are negative but focal lymphovascular invasion was noted.\par \par I discussed natural history and treatment options for early stage triple negative breast cancer.  Adjuvant chemotherapy is typically recommended for tumor size 6 mm or more. Triple negative breast cancer is aggressive and has a high risk for recurrence therefore chemotherapy can be considered for smaller T1 a tumors as well. She has LVI and therefore chemotherapy can be considered. Patient has very good organ function and performance status despite her age and is very highly motivated to get aggressive treatments. Patient wants to go ahead with the best possible chemotherapy option. I emphasized that Adriamycin based chemotherapy will be an overkill for T1 a tumor as well as will not be tolerable at her age therefore is not recommended. Patient said "age is just a number" and she is not ready to go anytime soon. She is very motivated to start chemotherapy ASAP. Patient reports that she came with an understanding that chemotherapy is on the table. \par \par Patient is very motivated and wants to get aggressive treatment upfront. She is willing to take chemotherapy with a good understanding of risks and benefits. We discussed that dose dense CMF will be appropriate adjuvant chemotherapy regimen for her with tolerable toxicity. Chemotherapy dose and schedule can be modified based on her tolerance as well.\par \par We discussed the data for adjuvant CMF. Adjuvant CMF is equivalent to AC x4 in terms of disease-free survival and overall survival based on randomized clinical trial data, but there is no direct comparison of ddACT or TC with CMF. Dose dense CMF was studied in a small feasibility study and we will follow the same protocol. (Kemi et al, -Clin Breast Cancer. 2010 Dec 1; 10(6): 461586)  <https://www.ncbi.nlm.nih.gov/entrez/eutils/elikatherinek.fcgi?dbfrom=pubmed&retmode=ref&cmd=prlinks&pl=80248789>.\par \par We discussed expected and unexpected side effects of chemotherapy, including but not limited to hair loss (minimal), fatigue, change in taste, mouth sores, nausea, vomiting, diarrhea, infusion reaction, allergic reaction, significant myelosuppression, need for blood transfusion, infection, bleeding, neuropathy, chemical cystitis, kidney injury, nerve pain, muscle pain and detrimental effect on liver function. Signed an informed consent after complete understanding of the risks, benefits and alternatives. Patient reports she has fatty liver. LFTs today are normal. We will keep an eye on her LFTs during chemotherapy.\par \par She asked other appropriate questions including the role of PARP inhibitors and immunotherapy. We discussed that she is not a candidate for Parp inhibitors in the adjuvant setting given the small disease. BRCA gene testing is pending. We reviewed the role of immunotherapy in the neoadjuvant setting followed by adjuvant setting for large node positive triple negative tumors. She also inquired the role of platinum therapy. We discussed toxicity associated with it and again no benefit in the adjuvant setting. We also discussed role of androgen receptor in triple negative breast cancer patients. We will ask pathology to check androgen receptor although the therapy is not indicated in the adjuvant setting.\par \par She is a candidate for radiation therapy. \par \par The patient had plenty of time to ask questions and all of her questions were answered to her satisfaction. I gave her my office phone number and encouraged her to call with any questions or additional information.\par \par \par \par PET\par  [Curative] : Goals of care discussed with patient: Curative

## 2022-02-03 NOTE — CONSULT LETTER
[Dear  ___] : Dear  [unfilled], [Consult Letter:] : I had the pleasure of evaluating your patient, [unfilled]. [Please see my note below.] : Please see my note below. [Consult Closing:] : Thank you very much for allowing me to participate in the care of this patient.  If you have any questions, please do not hesitate to contact me. [Sincerely,] : Sincerely, [FreeTextEntry3] : Gail Moody MD\par Division of Medical Oncology and Hematology\par [facility]\par [employer] [facility]\par [place], [place]\par \par

## 2022-02-04 ENCOUNTER — APPOINTMENT (OUTPATIENT)
Dept: NUCLEAR MEDICINE | Facility: CLINIC | Age: 77
End: 2022-02-04
Payer: MEDICARE

## 2022-02-04 ENCOUNTER — OUTPATIENT (OUTPATIENT)
Dept: OUTPATIENT SERVICES | Facility: HOSPITAL | Age: 77
LOS: 1 days | End: 2022-02-04
Payer: MEDICARE

## 2022-02-04 DIAGNOSIS — C50.919 MALIGNANT NEOPLASM OF UNSPECIFIED SITE OF UNSPECIFIED FEMALE BREAST: ICD-10-CM

## 2022-02-04 PROCEDURE — A9552: CPT

## 2022-02-04 PROCEDURE — 78815 PET IMAGE W/CT SKULL-THIGH: CPT

## 2022-02-04 PROCEDURE — 78815 PET IMAGE W/CT SKULL-THIGH: CPT | Mod: 26,PI,MH

## 2022-02-11 NOTE — PHYSICAL EXAM
[Sclera] : the sclera and conjunctiva were normal [Outer Ear] : the ears and nose were normal in appearance [Musculoskeletal - Swelling] : no joint swelling [] : no rash [No Focal Deficits] : no focal deficits [Normal] : well developed, well nourished, in no acute distress [Heart Rate And Rhythm] : heart rate and rhythm were normal [Breast Abnormal Lactation (Galactorrhea)] : no nipple discharge [No UE Edema] : there is no upper extremity edema [Abdomen Soft] : soft [Nondistended] : nondistended [Supraclavicular Lymph Nodes Enlarged Bilaterally] : supraclavicular [Axillary Lymph Nodes Enlarged Bilaterally] : axillary [de-identified] : Left lumpectomy and axillary scars are clean and dry, no erythema

## 2022-02-11 NOTE — HISTORY OF PRESENT ILLNESS
[FreeTextEntry1] : Ms. Crowe is a 77yo F w/ recently diagnosed triple negative left breast IDC, Grade3 s/p lumpectomy and SLNB w/ pT1aN0, AJCC Prognostic Group Stage IB. Patient presents to discuss radiation therapy.\par \par Brief Oncological History:\par She has a prior history of a benign excisional biopsy of the left breast in 2013, right breast in 1999 and an additional excisional biopsy with another surgeon at an unspecified time. Pathology demonstrated atypia but she has never been diagnosed with cancer. \par \par She underwent routine breast imaging with additional views on 5/21/2021(BIRADS 3) which showed small asymmetry was seen in the central left breast - questionable small nodules at the 3:00, 5:00 and 11:00 positions, with persistent small nodular asymmetry in the central posterior likely medial left breast by mammography, likely corresponding to a probable cyst at 10:00 on sonography. \par \par Left-sided mammogram and sonogram 6 months for close surveillance was performed in 11/17/2021( BI-RADS 4B) There was a 3 mm cyst vs.hypoechoic nodule in the left breast at 9:00, 6 cm FN, corresponding to a stable circumscribed nodule on mammogram. This appears somewhat irregular on sonography and ultrasound-guided biopsy was recommended.\par \par She underwent left breast, 9 o 'clock, ultrasound guided core biopsy core biopsy on 11/19/2021 which showed Invasive poorly differentiated ductal carcinoma with prominent lymphocytic infiltrate, Adrian score 8/9, measuring 0.3 cm, ER 0%, GA 0%, HER-2:Negative. There was no DCIS or LVI.\par \par She underwent a breast MRI on 12/02/2021 which showed the recently diagnosed left breast malignancy, a non-mass enhancement in the left outer retroareolar breast, possibly corresponding to a finding seen on ultrasound at 3:00 retroareolar for which biopsy is recommended. BL US on 12/09/2021(BIRADS 6) which showed a left breast 9:00 6 cm from the nipple 0.3 cm irregular shaped hypoechoic mass at site of biopsy-proven invasive cancer. There is a left breast 3:00 retroareolar bilobed hypoechoic nodule/complicated cyst which correlates with the MRI finding for which ultrasound-guided core biopsy and right breast 12:00 1 cm from the nipple 0.3 cm round irregular shaped hypoechoic mass with indistinct margins. \par \par She underwent bilateral breasts us core biopsy on 12/9/2021 the right breast 12 o'clock 1 cm FN biopsy revealed proliferative fibrocystic changes with focal moderate to florid usual ductal epithelial hyperplasia,duct ectasia showing two ducts with attenuated epithelial lining,thin fibrotic walls and surrounding mild chronic inflammation. The left breast, retroareolar biopsy at 3:00 revealed proliferative fibrocystic changes with moderate to florid usual ductal epithelial hyperplasia, cyst formation and apocrine metaplasia.\par \par She underwent left breast lumpectomy on 1/13/2022 which revealed invasive poorly differentiated ductal carcinoma with prominent chronic inflammatory infiltrate, [place] Grade 3, measuring 0.5 cm, Margin were negative. Focal LVI. Additionally, high grade DCIS cribriform type with complex sclerosing lesion noted. Two sentinel lymph node were negative for metastasis.\par \par She met with Dr. Moody, and plans for CMF x 8 cycles, followed by RT.  She starts chemotherapy next week.  She had genetic testing drawn last week.

## 2022-02-16 ENCOUNTER — APPOINTMENT (OUTPATIENT)
Dept: INFUSION THERAPY | Facility: HOSPITAL | Age: 77
End: 2022-02-16

## 2022-02-16 ENCOUNTER — APPOINTMENT (OUTPATIENT)
Dept: HEMATOLOGY ONCOLOGY | Facility: CLINIC | Age: 77
End: 2022-02-16
Payer: MEDICARE

## 2022-02-16 ENCOUNTER — RESULT REVIEW (OUTPATIENT)
Age: 77
End: 2022-02-16

## 2022-02-16 ENCOUNTER — NON-APPOINTMENT (OUTPATIENT)
Age: 77
End: 2022-02-16

## 2022-02-16 VITALS
RESPIRATION RATE: 16 BRPM | HEART RATE: 85 BPM | WEIGHT: 182.98 LBS | HEIGHT: 62 IN | OXYGEN SATURATION: 98 % | TEMPERATURE: 96.6 F | BODY MASS INDEX: 33.67 KG/M2 | SYSTOLIC BLOOD PRESSURE: 123 MMHG | DIASTOLIC BLOOD PRESSURE: 83 MMHG

## 2022-02-16 LAB
ALBUMIN SERPL ELPH-MCNC: 4.8 G/DL — SIGNIFICANT CHANGE UP (ref 3.3–5)
ALP SERPL-CCNC: 56 U/L — SIGNIFICANT CHANGE UP (ref 40–120)
ALT FLD-CCNC: 12 U/L — SIGNIFICANT CHANGE UP (ref 10–45)
ANION GAP SERPL CALC-SCNC: 14 MMOL/L — SIGNIFICANT CHANGE UP (ref 5–17)
AST SERPL-CCNC: 17 U/L — SIGNIFICANT CHANGE UP (ref 10–40)
BASOPHILS # BLD AUTO: 0.09 K/UL — SIGNIFICANT CHANGE UP (ref 0–0.2)
BASOPHILS NFR BLD AUTO: 1.2 % — SIGNIFICANT CHANGE UP (ref 0–2)
BILIRUB SERPL-MCNC: 0.2 MG/DL — SIGNIFICANT CHANGE UP (ref 0.2–1.2)
BUN SERPL-MCNC: 25 MG/DL — HIGH (ref 7–23)
CALCIUM SERPL-MCNC: 9.3 MG/DL — SIGNIFICANT CHANGE UP (ref 8.4–10.5)
CHLORIDE SERPL-SCNC: 100 MMOL/L — SIGNIFICANT CHANGE UP (ref 96–108)
CO2 SERPL-SCNC: 28 MMOL/L — SIGNIFICANT CHANGE UP (ref 22–31)
CREAT SERPL-MCNC: 1.12 MG/DL — SIGNIFICANT CHANGE UP (ref 0.5–1.3)
EOSINOPHIL # BLD AUTO: 0.26 K/UL — SIGNIFICANT CHANGE UP (ref 0–0.5)
EOSINOPHIL NFR BLD AUTO: 3.4 % — SIGNIFICANT CHANGE UP (ref 0–6)
GLUCOSE SERPL-MCNC: 88 MG/DL — SIGNIFICANT CHANGE UP (ref 70–99)
HCT VFR BLD CALC: 40.3 % — SIGNIFICANT CHANGE UP (ref 34.5–45)
HGB BLD-MCNC: 13.6 G/DL — SIGNIFICANT CHANGE UP (ref 11.5–15.5)
IMM GRANULOCYTES NFR BLD AUTO: 1.4 % — SIGNIFICANT CHANGE UP (ref 0–1.5)
LYMPHOCYTES # BLD AUTO: 1.8 K/UL — SIGNIFICANT CHANGE UP (ref 1–3.3)
LYMPHOCYTES # BLD AUTO: 23.4 % — SIGNIFICANT CHANGE UP (ref 13–44)
MCHC RBC-ENTMCNC: 29.9 PG — SIGNIFICANT CHANGE UP (ref 27–34)
MCHC RBC-ENTMCNC: 33.7 G/DL — SIGNIFICANT CHANGE UP (ref 32–36)
MCV RBC AUTO: 88.6 FL — SIGNIFICANT CHANGE UP (ref 80–100)
MONOCYTES # BLD AUTO: 0.73 K/UL — SIGNIFICANT CHANGE UP (ref 0–0.9)
MONOCYTES NFR BLD AUTO: 9.5 % — SIGNIFICANT CHANGE UP (ref 2–14)
NEUTROPHILS # BLD AUTO: 4.71 K/UL — SIGNIFICANT CHANGE UP (ref 1.8–7.4)
NEUTROPHILS NFR BLD AUTO: 61.1 % — SIGNIFICANT CHANGE UP (ref 43–77)
NRBC # BLD: 0 /100 WBCS — SIGNIFICANT CHANGE UP (ref 0–0)
PLATELET # BLD AUTO: 341 K/UL — SIGNIFICANT CHANGE UP (ref 150–400)
POTASSIUM SERPL-MCNC: 4.8 MMOL/L — SIGNIFICANT CHANGE UP (ref 3.5–5.3)
POTASSIUM SERPL-SCNC: 4.8 MMOL/L — SIGNIFICANT CHANGE UP (ref 3.5–5.3)
PROT SERPL-MCNC: 7 G/DL — SIGNIFICANT CHANGE UP (ref 6–8.3)
RBC # BLD: 4.55 M/UL — SIGNIFICANT CHANGE UP (ref 3.8–5.2)
RBC # FLD: 12.6 % — SIGNIFICANT CHANGE UP (ref 10.3–14.5)
SODIUM SERPL-SCNC: 141 MMOL/L — SIGNIFICANT CHANGE UP (ref 135–145)
WBC # BLD: 7.7 K/UL — SIGNIFICANT CHANGE UP (ref 3.8–10.5)
WBC # FLD AUTO: 7.7 K/UL — SIGNIFICANT CHANGE UP (ref 3.8–10.5)

## 2022-02-16 PROCEDURE — 99215 OFFICE O/P EST HI 40 MIN: CPT

## 2022-02-17 ENCOUNTER — NON-APPOINTMENT (OUTPATIENT)
Age: 77
End: 2022-02-17

## 2022-02-17 ENCOUNTER — APPOINTMENT (OUTPATIENT)
Dept: INFUSION THERAPY | Facility: HOSPITAL | Age: 77
End: 2022-02-17

## 2022-02-17 DIAGNOSIS — C50.919 MALIGNANT NEOPLASM OF UNSPECIFIED SITE OF UNSPECIFIED FEMALE BREAST: ICD-10-CM

## 2022-02-17 DIAGNOSIS — Z51.11 ENCOUNTER FOR ANTINEOPLASTIC CHEMOTHERAPY: ICD-10-CM

## 2022-02-17 DIAGNOSIS — R11.2 NAUSEA WITH VOMITING, UNSPECIFIED: ICD-10-CM

## 2022-02-17 PROBLEM — M19.90 UNSPECIFIED OSTEOARTHRITIS, UNSPECIFIED SITE: Chronic | Status: ACTIVE | Noted: 2022-02-14

## 2022-02-17 PROBLEM — C50.912 MALIGNANT NEOPLASM OF UNSPECIFIED SITE OF LEFT FEMALE BREAST: Chronic | Status: ACTIVE | Noted: 2022-02-14

## 2022-02-17 LAB
HBV CORE AB SER-ACNC: SIGNIFICANT CHANGE UP
HBV SURFACE AB SER-ACNC: SIGNIFICANT CHANGE UP
HBV SURFACE AG SER-ACNC: SIGNIFICANT CHANGE UP
HCV AB S/CO SERPL IA: 0.14 S/CO — SIGNIFICANT CHANGE UP (ref 0–0.99)
HCV AB SERPL-IMP: SIGNIFICANT CHANGE UP

## 2022-02-18 DIAGNOSIS — Z51.89 ENCOUNTER FOR OTHER SPECIFIED AFTERCARE: ICD-10-CM

## 2022-02-19 NOTE — REASON FOR VISIT
[Follow-Up Visit] : a follow-up [Friend] : friend [FreeTextEntry2] : invasive poorly differentiated ductal carcinoma of the left breast

## 2022-02-19 NOTE — PHYSICAL EXAM
[Fully active, able to carry on all pre-disease performance without restriction] : Status 0 - Fully active, able to carry on all pre-disease performance without restriction [Normal] : affect appropriate [de-identified] : healing lumpectomy scar

## 2022-02-19 NOTE — ASSESSMENT
[Curative] : Goals of care discussed with patient: Curative [FreeTextEntry1] : Ms. 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 She is s/p lumpectomy on 1/13/2022. Tumor size is 5 mm, 2 sentinel lymph nodes are negative but focal lymphovascular invasion was noted. Adj CMF started 2/16/2022\par \par - Breast ca: On ddCMF chemo. C 1/8\par Patient is on cytotoxic chemotherapy and needs intensive monitoring for severe toxicity.\par CBC reviewed with the patient today to make sure she is not neutropenic from high risk cancer therapy drug.  We will continue to monitor CBC every 2 to 3 weeks for intense drug monitoring.\par - Chemo induced anemia- No transfusion needed. Monitor counts\par - Mild leukocytosis secondary to ONPRO. Check cbc each cycle\par - Chemo induced diarrhea-  Imodium PRN. Maintain PO hydration. BRAT diet\par - Chemo induced N/V- Will get premedications with Emend, dexamethasone and Aloxi. She will continue dexamethasone for next 3 days for antinausea prophylaxis. She will use Reglan as needed. \par - GF induced bone pain- She will take Claritin. \par - Chemo induced dysgeusia, wt loss and fatigue: Encourage p.o. fluids, small frequent meals.\par - Instructed to call office and go directly to the emergency room with fever more than 100.4, shaking chills, productive cough, sore throat, shortness of breath or urinary symptoms. Patient verbalized understanding and agreement.\par - Emotional support provided, all questions answered.\par - Patient is on cytotoxic chemotherapy and needs intensive monitoring for severe toxicity.\par RTO 2 weeks\par \par \par \par \par The patient had plenty of time to ask questions and all of her questions were answered to her satisfaction. I gave her my office phone number and encouraged her to call with any questions or additional information.\par \par \par \par PET\par

## 2022-02-19 NOTE — HISTORY OF PRESENT ILLNESS
[T: ___] : T[unfilled] [N: ___] : N[unfilled] [M: ___] : M[unfilled] [AJCC Stage: ____] : AJCC Stage: [unfilled] [5 - Distress Level] : Distress Level: 5 [Patient given social work contact information and resource sheet] : Patient was given social work contact information and resource sheet [de-identified] : Ms.Ruth Crowe  is a 76 year old female here for an evaluation of breast cancer. Her oncologic history is as follows:\par \par She has a prior history of a benign excisional biopsy of the left breast in 2013, right breast in 1999 and an additional excisional biopsy with another surgeon at an unspecified time. Pathology demonstrated atypia but she has never been diagnosed with cancer. \par \par She underwent routine breast imaging on 5/21/2021(BIRADS 0) which showed  small asymmetry was seen in the central left breast for which additional views are recommended. There are questionable small nodules at the 3:00, 5:00 and 11:00 positions of the left breast on ultrasound for which a targeted ultrasound was recommended  \par Additional left breast mammo/sono on 5/31/21( BIRADS 3) revealed persistent small nodular asymmetry in the central posterior likely medial left breast by mammography, likely corresponding to a probable cyst at 10:00 on sonography. Follow-up left diagnostic mammography and targeted left breast sonography in 6 months are recommended to confirm stability of these findings.There is an additional small nodule in the upper left breast on mammo, with no sonogram correlate. for which 6 months follow-up left diagnostic mammography is recommended. There are probably benign findings on US at the 5:00 and 11:00 left breast for which follow-up targeted left breast sonography in 6 months is recommended.\par Left-sided mammogram and sonogram was performed in 11/17/2021( BI-RADS 4B) There is a 3 mm cyst vs.hypoechoic nodule in the left breast at 9:00, 6 cmfn, corresponding to a stable circumscribed nodule on mammogram. This appears somewhat irregular on sonography and ultrasound-guided biopsy is recommended.\par \par She underwent  left breast, 9 o 'clock, ultrasound guided core biopsy core biopsy on 11/19/2021 which showed Invasive poorly differentiated ductal carcinoma with prominent\par lymphocytic infiltrate,Adrian score 8/9, measuring 0.3 cm, ER:Negative, 0%, PgR:Negative, 0%, HER-2:Negative. No Ductal carcinoma in situ or microcalcifications present\par No lymphovascular permeation seen.\par She underwent a breast MRI on  12/02/2021(BI-RADS 4A) which showed recently diagnosed left breast malignancy, a non-mass enhancement in the left outer retroareolar breast, possibly corresponding to a finding seen on ultrasound at 3:00 retroareolar for which targeted left breast ultrasound and ultrasound-guided core needle biopsy is recommended.In addition, in view of the multiple bilateral enhancing foci and the presence of multiple masses on previous ultrasound studies, bilateral ultrasound is recommended at this time, to determine if any other masses in either breast demonstrate indeterminate or suspicious morphology warranting biopsy.\par \par She had BL US on  12/09/2021(BIRADS 6) which showed a left breast 9:00 6 cm from the nipple 0.3 cm irregular shaped hypoechoic mass at site of biopsy-proven invasive cancer. There is a left breast 3:00 retroareolar bilobed hypoechoic nodule/complicated cyst which correlates with the MRI finding for which ultrasound-guided core biopsy is rec.\par There is a right breast 12:00 1 cm from the nipple 0.3 cm round irregular shaped hypoechoic mass with indistinct margins. for which for ultrasound-guided core biopsy is rec.\par \par  She underwent bilateral breasts us core biopsy on 12/9/2021 the right breast 12 o'clock 1 cmfn biopsy revealed proliferative fibrocystic changes with focal moderate to florid usual ductal epithelial hyperplasia,duct ectasia showing two ducts with attenuated epithelial lining,thin fibrotic walls and surrounding mild chronic inflammation The left breast, retroareolar biopsy at 3:00 revealed proliferative fibrocystic changes with moderate to florid usual ductal epithelial hyperplasia, cyst formation and apocrine metaplasia.\par \par She underwent left breast lumpectomy on  1/13/2022 which revealed invasive poorly differentiated ductal carcinoma with prominent chronic inflammatory infiltrate,  Arden grade 3, measuring 0.5 cm, Margin were negative. Small focus of Lymphovascular invasion was noted. 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 She is Ashkenazi Bahai.\par \par I discussed natural history and treatment options for early stage triple negative breast cancer.  Adjuvant chemotherapy is typically recommended for tumor size 6 mm or more. Triple negative breast cancer is aggressive and has a high risk for recurrence therefore chemotherapy can be considered for smaller T1 a tumors as well. She has LVI and therefore chemotherapy can be considered. Patient has very good organ function and performance status despite her age and is very highly motivated to get aggressive treatments. Patient wants to go ahead with the best possible chemotherapy option. I emphasized that Adriamycin based chemotherapy will be an overkill for T1 a tumor as well as will not be tolerable at her age therefore is not recommended. Patient said "age is just a number" and she is not ready to go anytime soon. She is very motivated to start chemotherapy ASAP. Patient reports that she came with an understanding that chemotherapy is on the table. \par \par Patient is very motivated and wants to get aggressive treatment upfront. She is willing to take chemotherapy with a good understanding of risks and benefits. We discussed that dose dense CMF will be appropriate adjuvant chemotherapy regimen for her with tolerable toxicity. Chemotherapy dose and schedule can be modified based on her tolerance as well.\par \par We discussed the data for adjuvant CMF. Adjuvant CMF is equivalent to AC x4 in terms of disease-free survival and overall survival based on randomized clinical trial data, but there is no direct comparison of ddACT or TC with CMF. Dose dense CMF was studied in a small feasibility study and we will follow the same protocol. (Kemi et al, -Clin Breast Cancer. 2010 Dec 1; 10(6): 275308)  <https://www.ncbi.nlm.nih.gov/entrez/eutils/elink.fcgi?dbfrom=pubmed&retmode=ref&cmd=prlinks&dx=55952024>.\par \par We discussed expected and unexpected side effects of chemotherapy, including but not limited to hair loss (minimal), fatigue, change in taste, mouth sores, nausea, vomiting, diarrhea, infusion reaction, allergic reaction, significant myelosuppression, need for blood transfusion, infection, bleeding, neuropathy, chemical cystitis, kidney injury, nerve pain, muscle pain and detrimental effect on liver function. Signed an informed consent after complete understanding of the risks, benefits and alternatives. Patient reports she has fatty liver. LFTs today are normal. We will keep an eye on her LFTs during chemotherapy.\par \par She asked other appropriate questions including the role of PARP inhibitors and immunotherapy. We discussed that she is not a candidate for Parp inhibitors in the adjuvant setting given the small disease. BRCA gene testing is pending. We reviewed the role of immunotherapy in the neoadjuvant setting followed by adjuvant setting for large node positive triple negative tumors. She also inquired the role of platinum therapy. We discussed toxicity associated with it and again no benefit in the adjuvant setting. We also discussed role of androgen receptor in triple negative breast cancer patients. We will ask pathology to check androgen receptor although the therapy is not indicated in the adjuvant setting.\par \par She is a candidate for radiation therapy. \par  [de-identified] : Ms. 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 She is s/p lumpectomy on 1/13/2022. Tumor size is 5 mm, 2 sentinel lymph nodes are negative but focal lymphovascular invasion was noted.\par \par Here to start CMF + ONPRO q 3WKS X  8 cycles  # 1 today 2/16/22 Counts good\par Chemo s/e discussed\par Medication for symptom management reviewed and questions answered\par PET CT 02/04/2022 Mildly FDG avid amorphous soft tissue in the medial aspect of the left breast and mildly FDG avid soft tissue and low-attenuation density in the left axilla likely represents post surgical changes. Please correlate clinically and with follow-up study. No evidence of metastatic disease.\par 2. Nonspecific minimally FDG avid nodular soft tissue within the right breast, likely representing post biopsy changes. Correlate clinically\par 3. Nonspecific hypermetabolism in the right colon with no definite abnormality on CT. Please correlate clinically or with colonoscopy as warranted.\par Patient recently had colonoscopy she will f/u with GI prn\par EKG done , Chemo consent obtained \par Patient is a anxious about stating chemo today\par Emotional support given

## 2022-02-28 ENCOUNTER — OUTPATIENT (OUTPATIENT)
Dept: OUTPATIENT SERVICES | Facility: HOSPITAL | Age: 77
LOS: 1 days | Discharge: HOME | End: 2022-02-28

## 2022-02-28 DIAGNOSIS — Z98.890 OTHER SPECIFIED POSTPROCEDURAL STATES: Chronic | ICD-10-CM

## 2022-02-28 DIAGNOSIS — Z90.722 ACQUIRED ABSENCE OF OVARIES, BILATERAL: Chronic | ICD-10-CM

## 2022-02-28 DIAGNOSIS — D64.9 ANEMIA, UNSPECIFIED: ICD-10-CM

## 2022-03-02 ENCOUNTER — RESULT REVIEW (OUTPATIENT)
Age: 77
End: 2022-03-02

## 2022-03-02 ENCOUNTER — APPOINTMENT (OUTPATIENT)
Dept: HEMATOLOGY ONCOLOGY | Facility: CLINIC | Age: 77
End: 2022-03-02
Payer: MEDICARE

## 2022-03-02 ENCOUNTER — APPOINTMENT (OUTPATIENT)
Dept: INFUSION THERAPY | Facility: HOSPITAL | Age: 77
End: 2022-03-02

## 2022-03-02 VITALS
SYSTOLIC BLOOD PRESSURE: 109 MMHG | RESPIRATION RATE: 16 BRPM | BODY MASS INDEX: 34.89 KG/M2 | WEIGHT: 189.58 LBS | DIASTOLIC BLOOD PRESSURE: 69 MMHG | OXYGEN SATURATION: 96 % | HEIGHT: 61.81 IN | HEART RATE: 86 BPM | TEMPERATURE: 97.1 F

## 2022-03-02 DIAGNOSIS — R11.2 NAUSEA WITH VOMITING, UNSPECIFIED: ICD-10-CM

## 2022-03-02 DIAGNOSIS — Z51.89 ENCOUNTER FOR OTHER SPECIFIED AFTERCARE: ICD-10-CM

## 2022-03-02 DIAGNOSIS — C50.919 MALIGNANT NEOPLASM OF UNSPECIFIED SITE OF UNSPECIFIED FEMALE BREAST: ICD-10-CM

## 2022-03-02 DIAGNOSIS — Z51.11 ENCOUNTER FOR ANTINEOPLASTIC CHEMOTHERAPY: ICD-10-CM

## 2022-03-02 DIAGNOSIS — K13.79 OTHER LESIONS OF ORAL MUCOSA: ICD-10-CM

## 2022-03-02 LAB
ALBUMIN SERPL ELPH-MCNC: 4.5 G/DL — SIGNIFICANT CHANGE UP (ref 3.3–5)
ALP SERPL-CCNC: 118 U/L — SIGNIFICANT CHANGE UP (ref 40–120)
ALT FLD-CCNC: 60 U/L — HIGH (ref 10–45)
ANION GAP SERPL CALC-SCNC: 13 MMOL/L — SIGNIFICANT CHANGE UP (ref 5–17)
AST SERPL-CCNC: 25 U/L — SIGNIFICANT CHANGE UP (ref 10–40)
BASOPHILS # BLD AUTO: 0.31 K/UL — HIGH (ref 0–0.2)
BASOPHILS NFR BLD AUTO: 2 % — SIGNIFICANT CHANGE UP (ref 0–2)
BILIRUB SERPL-MCNC: <0.2 MG/DL — SIGNIFICANT CHANGE UP (ref 0.2–1.2)
BUN SERPL-MCNC: 15 MG/DL — SIGNIFICANT CHANGE UP (ref 7–23)
CALCIUM SERPL-MCNC: 9.4 MG/DL — SIGNIFICANT CHANGE UP (ref 8.4–10.5)
CHLORIDE SERPL-SCNC: 104 MMOL/L — SIGNIFICANT CHANGE UP (ref 96–108)
CHOLEST SERPL-MCNC: 194 MG/DL — SIGNIFICANT CHANGE UP
CO2 SERPL-SCNC: 26 MMOL/L — SIGNIFICANT CHANGE UP (ref 22–31)
CREAT SERPL-MCNC: 0.88 MG/DL — SIGNIFICANT CHANGE UP (ref 0.5–1.3)
EGFR: 68 ML/MIN/1.73M2 — SIGNIFICANT CHANGE UP
EOSINOPHIL # BLD AUTO: 0.16 K/UL — SIGNIFICANT CHANGE UP (ref 0–0.5)
EOSINOPHIL NFR BLD AUTO: 1 % — SIGNIFICANT CHANGE UP (ref 0–6)
GLUCOSE SERPL-MCNC: 93 MG/DL — SIGNIFICANT CHANGE UP (ref 70–99)
HCT VFR BLD CALC: 39.8 % — SIGNIFICANT CHANGE UP (ref 34.5–45)
HDLC SERPL-MCNC: 54 MG/DL — SIGNIFICANT CHANGE UP
HGB BLD-MCNC: 13.1 G/DL — SIGNIFICANT CHANGE UP (ref 11.5–15.5)
LIPID PNL WITH DIRECT LDL SERPL: 81 MG/DL — SIGNIFICANT CHANGE UP
LYMPHOCYTES # BLD AUTO: 27 % — SIGNIFICANT CHANGE UP (ref 13–44)
LYMPHOCYTES # BLD AUTO: 4.23 K/UL — HIGH (ref 1–3.3)
MCHC RBC-ENTMCNC: 29.6 PG — SIGNIFICANT CHANGE UP (ref 27–34)
MCHC RBC-ENTMCNC: 32.9 G/DL — SIGNIFICANT CHANGE UP (ref 32–36)
MCV RBC AUTO: 90 FL — SIGNIFICANT CHANGE UP (ref 80–100)
MONOCYTES # BLD AUTO: 0.78 K/UL — SIGNIFICANT CHANGE UP (ref 0–0.9)
MONOCYTES NFR BLD AUTO: 5 % — SIGNIFICANT CHANGE UP (ref 2–14)
MYELOCYTES NFR BLD: 1 % — HIGH (ref 0–0)
NEUTROPHILS # BLD AUTO: 10.03 K/UL — HIGH (ref 1.8–7.4)
NEUTROPHILS NFR BLD AUTO: 64 % — SIGNIFICANT CHANGE UP (ref 43–77)
NON HDL CHOLESTEROL: 141 MG/DL — HIGH
NRBC # BLD: 0 /100 — SIGNIFICANT CHANGE UP (ref 0–0)
NRBC # BLD: SIGNIFICANT CHANGE UP /100 WBCS (ref 0–0)
PLAT MORPH BLD: NORMAL — SIGNIFICANT CHANGE UP
PLATELET # BLD AUTO: 260 K/UL — SIGNIFICANT CHANGE UP (ref 150–400)
POTASSIUM SERPL-MCNC: 5.5 MMOL/L — HIGH (ref 3.5–5.3)
POTASSIUM SERPL-SCNC: 5.5 MMOL/L — HIGH (ref 3.5–5.3)
PROT SERPL-MCNC: 7 G/DL — SIGNIFICANT CHANGE UP (ref 6–8.3)
RBC # BLD: 4.42 M/UL — SIGNIFICANT CHANGE UP (ref 3.8–5.2)
RBC # FLD: 13.2 % — SIGNIFICANT CHANGE UP (ref 10.3–14.5)
RBC BLD AUTO: NORMAL — SIGNIFICANT CHANGE UP
SODIUM SERPL-SCNC: 143 MMOL/L — SIGNIFICANT CHANGE UP (ref 135–145)
T4 FREE+ TSH PNL SERPL: 2.71 UIU/ML — SIGNIFICANT CHANGE UP (ref 0.27–4.2)
TOXIC GRANULES BLD QL SMEAR: PRESENT — SIGNIFICANT CHANGE UP
TRIGL SERPL-MCNC: 297 MG/DL — HIGH
WBC # BLD: 15.67 K/UL — HIGH (ref 3.8–10.5)
WBC # FLD AUTO: 15.67 K/UL — HIGH (ref 3.8–10.5)

## 2022-03-02 PROCEDURE — 99215 OFFICE O/P EST HI 40 MIN: CPT

## 2022-03-03 ENCOUNTER — NON-APPOINTMENT (OUTPATIENT)
Age: 77
End: 2022-03-03

## 2022-03-08 NOTE — REASON FOR VISIT
[Follow-Up Visit] : a follow-up [FreeTextEntry2] : invasive poorly differentiated ductal carcinoma of the left breast

## 2022-03-08 NOTE — PHYSICAL EXAM
[Fully active, able to carry on all pre-disease performance without restriction] : Status 0 - Fully active, able to carry on all pre-disease performance without restriction [Normal] : normoactive bowel sounds, soft and nontender, no hepatosplenomegaly or masses appreciated [de-identified] : healing lumpectomy scar

## 2022-03-08 NOTE — HISTORY OF PRESENT ILLNESS
[T: ___] : T[unfilled] [N: ___] : N[unfilled] [M: ___] : M[unfilled] [AJCC Stage: ____] : AJCC Stage: [unfilled] [5 - Distress Level] : Distress Level: 5 [Patient given social work contact information and resource sheet] : Patient was given social work contact information and resource sheet [de-identified] : Ms.Ruth Crowe  is a 76 year old female here for an evaluation of breast cancer. Her oncologic history is as follows:\par \par She has a prior history of a benign excisional biopsy of the left breast in 2013, right breast in 1999 and an additional excisional biopsy with another surgeon at an unspecified time. Pathology demonstrated atypia but she has never been diagnosed with cancer. \par \par She underwent routine breast imaging on 5/21/2021(BIRADS 0) which showed  small asymmetry was seen in the central left breast for which additional views are recommended. There are questionable small nodules at the 3:00, 5:00 and 11:00 positions of the left breast on ultrasound for which a targeted ultrasound was recommended  \par Additional left breast mammo/sono on 5/31/21( BIRADS 3) revealed persistent small nodular asymmetry in the central posterior likely medial left breast by mammography, likely corresponding to a probable cyst at 10:00 on sonography. Follow-up left diagnostic mammography and targeted left breast sonography in 6 months are recommended to confirm stability of these findings.There is an additional small nodule in the upper left breast on mammo, with no sonogram correlate. for which 6 months follow-up left diagnostic mammography is recommended. There are probably benign findings on US at the 5:00 and 11:00 left breast for which follow-up targeted left breast sonography in 6 months is recommended.\par Left-sided mammogram and sonogram was performed in 11/17/2021( BI-RADS 4B) There is a 3 mm cyst vs.hypoechoic nodule in the left breast at 9:00, 6 cmfn, corresponding to a stable circumscribed nodule on mammogram. This appears somewhat irregular on sonography and ultrasound-guided biopsy is recommended.\par \par She underwent  left breast, 9 o 'clock, ultrasound guided core biopsy core biopsy on 11/19/2021 which showed Invasive poorly differentiated ductal carcinoma with prominent\par lymphocytic infiltrate,Adrian score 8/9, measuring 0.3 cm, ER:Negative, 0%, PgR:Negative, 0%, HER-2:Negative. No Ductal carcinoma in situ or microcalcifications present\par No lymphovascular permeation seen.\par She underwent a breast MRI on  12/02/2021(BI-RADS 4A) which showed recently diagnosed left breast malignancy, a non-mass enhancement in the left outer retroareolar breast, possibly corresponding to a finding seen on ultrasound at 3:00 retroareolar for which targeted left breast ultrasound and ultrasound-guided core needle biopsy is recommended.In addition, in view of the multiple bilateral enhancing foci and the presence of multiple masses on previous ultrasound studies, bilateral ultrasound is recommended at this time, to determine if any other masses in either breast demonstrate indeterminate or suspicious morphology warranting biopsy.\par \par She had BL US on  12/09/2021(BIRADS 6) which showed a left breast 9:00 6 cm from the nipple 0.3 cm irregular shaped hypoechoic mass at site of biopsy-proven invasive cancer. There is a left breast 3:00 retroareolar bilobed hypoechoic nodule/complicated cyst which correlates with the MRI finding for which ultrasound-guided core biopsy is rec.\par There is a right breast 12:00 1 cm from the nipple 0.3 cm round irregular shaped hypoechoic mass with indistinct margins. for which for ultrasound-guided core biopsy is rec.\par \par  She underwent bilateral breasts us core biopsy on 12/9/2021 the right breast 12 o'clock 1 cmfn biopsy revealed proliferative fibrocystic changes with focal moderate to florid usual ductal epithelial hyperplasia,duct ectasia showing two ducts with attenuated epithelial lining,thin fibrotic walls and surrounding mild chronic inflammation The left breast, retroareolar biopsy at 3:00 revealed proliferative fibrocystic changes with moderate to florid usual ductal epithelial hyperplasia, cyst formation and apocrine metaplasia.\par \par She underwent left breast lumpectomy on  1/13/2022 which revealed invasive poorly differentiated ductal carcinoma with prominent chronic inflammatory infiltrate,  Colon grade 3, measuring 0.5 cm, Margin were negative. Small focus of Lymphovascular invasion was noted. 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 She is Ashkenazi [employer].\par \par I discussed natural history and treatment options for early stage triple negative breast cancer.  Adjuvant chemotherapy is typically recommended for tumor size 6 mm or more. Triple negative breast cancer is aggressive and has a high risk for recurrence therefore chemotherapy can be considered for smaller T1 a tumors as well. She has LVI and therefore chemotherapy can be considered. Patient has very good organ function and performance status despite her age and is very highly motivated to get aggressive treatments. Patient wants to go ahead with the best possible chemotherapy option. I emphasized that Adriamycin based chemotherapy will be an overkill for T1 a tumor as well as will not be tolerable at her age therefore is not recommended. Patient said "age is just a number" and she is not ready to go anytime soon. She is very motivated to start chemotherapy ASAP. Patient reports that she came with an understanding that chemotherapy is on the table. \par \par Patient is very motivated and wants to get aggressive treatment upfront. She is willing to take chemotherapy with a good understanding of risks and benefits. We discussed that dose dense CMF will be appropriate adjuvant chemotherapy regimen for her with tolerable toxicity. Chemotherapy dose and schedule can be modified based on her tolerance as well.\par \par We discussed the data for adjuvant CMF. Adjuvant CMF is equivalent to AC x4 in terms of disease-free survival and overall survival based on randomized clinical trial data, but there is no direct comparison of ddACT or TC with CMF. Dose dense CMF was studied in a small feasibility study and we will follow the same protocol. (Kemi et al, -Clin Breast Cancer. 2010 Dec 1; 10(6): 314790)  <https://www.ncbi.nlm.nih.gov/entrez/eutils/elink.fcgi?dbfrom=pubmed&retmode=ref&cmd=prlinks&if=87734863>.\par \par We discussed expected and unexpected side effects of chemotherapy, including but not limited to hair loss (minimal), fatigue, change in taste, mouth sores, nausea, vomiting, diarrhea, infusion reaction, allergic reaction, significant myelosuppression, need for blood transfusion, infection, bleeding, neuropathy, chemical cystitis, kidney injury, nerve pain, muscle pain and detrimental effect on liver function. Signed an informed consent after complete understanding of the risks, benefits and alternatives. Patient reports she has fatty liver. LFTs today are normal. We will keep an eye on her LFTs during chemotherapy.\par \par She asked other appropriate questions including the role of PARP inhibitors and immunotherapy. We discussed that she is not a candidate for Parp inhibitors in the adjuvant setting given the small disease. BRCA gene testing is pending. We reviewed the role of immunotherapy in the neoadjuvant setting followed by adjuvant setting for large node positive triple negative tumors. She also inquired the role of platinum therapy. We discussed toxicity associated with it and again no benefit in the adjuvant setting. We also discussed role of androgen receptor in triple negative breast cancer patients. We will ask pathology to check androgen receptor although the therapy is not indicated in the adjuvant setting.\par \par She is a candidate for radiation therapy. \par  [de-identified] : Ms. 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 She is s/p lumpectomy on 1/13/2022. Tumor size is 5 mm, 2 sentinel lymph nodes are negative but focal lymphovascular invasion was noted.\par \par Here to start CMF + ONPRO q 2WKS X  8 cycles  # 2 today 3/2/22 Counts good\par Chemo s/e discussed\par Medication for symptom management reviewed and questions answered\par PET CT 02/04/2022 Mildly FDG avid amorphous soft tissue in the medial aspect of the left breast and mildly FDG avid soft tissue and low-attenuation density in the left axilla likely represents post surgical changes. Please correlate clinically and with follow-up study. No evidence of metastatic disease.\par 2. Nonspecific minimally FDG avid nodular soft tissue within the right breast, likely representing post biopsy changes. Correlate clinically\par 3. Nonspecific hypermetabolism in the right colon with no definite abnormality on CT. Please correlate clinically or with colonoscopy as warranted.\par Patient recently had colonoscopy she will f/u with GI prn\par EKG done , Chemo consent obtained \par Patient is a anxious about stating chemo today\par Emotional support given\par  She reports mild-moderate fatigue and altered taste x 3-4 days after chemo. She was able to function, maintained PO intake, weight stable. She had mild nausea, took Reglan, no vomiting, no diarrhea.Had mucositis and some mouth sores used biotene gel with relief. She had mild bone pain, head aches  on D5-6 relieved with activity. No  dizziness or other neuro symptoms . No neuropathy, no fevers. No hair loss.\par \par \par

## 2022-03-08 NOTE — ASSESSMENT
[Curative] : Goals of care discussed with patient: Curative [FreeTextEntry1] : Ms. 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 She is s/p lumpectomy on 1/13/2022. Tumor size is 5 mm, 2 sentinel lymph nodes are negative but focal lymphovascular invasion was noted. Adj CMF started 2/16/2022\par \par - Breast ca: On ddCMF chemo. C 2/8 3/2/22 Tolerating well with expected s/e She reports head aches  on D5-6 relieved with activity. No other neuro symptoms most likely 2/2 dehydration. \par Patient is on cytotoxic chemotherapy and needs intensive monitoring for severe toxicity.\par CBC reviewed with the patient today to make sure she is not neutropenic from high risk cancer therapy drug.  We will continue to monitor CBC every 2 to 3 weeks for intense drug monitoring. Counts good, will proceed with tx.\par - Chemo induced anemia-  No transfusion needed. Monitor counts\par - Mild leukocytosis secondary to ONPRO. Check cbc each cycle\par - Chemo induced diarrhea-  Imodium PRN. Maintain PO hydration. BRAT diet\par - Chemo induced N/V- Will get premedications with Emend, dexamethasone and Aloxi. She will continue dexamethasone for next 3 days for antinausea prophylaxis. She will use Reglan as needed. \par - GF induced bone pain- She will take Claritin\par -Mucositis- mild mouth sores , continue biotene  if worsens will start first mouthwash . \par - Chemo induced dysgeusia, wt loss and fatigue: Encourage p.o. fluids, small frequent meals.\par - Instructed to call office and go directly to the emergency room with fever more than 100.4, shaking chills, productive cough, sore throat, shortness of breath or urinary symptoms. Patient verbalized understanding and agreement.\par - Emotional support provided, all questions answered.\par - Patient is on cytotoxic chemotherapy and needs intensive monitoring for severe toxicity.\par RTO 2 weeks\par \par \par \par \par The patient had plenty of time to ask questions and all of her questions were answered to her satisfaction. I gave her my office phone number and encouraged her to call with any questions or additional information.\par \par \par \par PET\par

## 2022-03-16 ENCOUNTER — APPOINTMENT (OUTPATIENT)
Dept: HEMATOLOGY ONCOLOGY | Facility: CLINIC | Age: 77
End: 2022-03-16
Payer: MEDICARE

## 2022-03-16 ENCOUNTER — RESULT REVIEW (OUTPATIENT)
Age: 77
End: 2022-03-16

## 2022-03-16 ENCOUNTER — APPOINTMENT (OUTPATIENT)
Dept: INFUSION THERAPY | Facility: HOSPITAL | Age: 77
End: 2022-03-16

## 2022-03-16 VITALS
BODY MASS INDEX: 34.45 KG/M2 | RESPIRATION RATE: 16 BRPM | HEIGHT: 62.2 IN | DIASTOLIC BLOOD PRESSURE: 72 MMHG | SYSTOLIC BLOOD PRESSURE: 115 MMHG | TEMPERATURE: 96.8 F | WEIGHT: 189.58 LBS | OXYGEN SATURATION: 99 % | HEART RATE: 78 BPM

## 2022-03-16 LAB
ALBUMIN SERPL ELPH-MCNC: 4.5 G/DL — SIGNIFICANT CHANGE UP (ref 3.3–5)
ALP SERPL-CCNC: 119 U/L — SIGNIFICANT CHANGE UP (ref 40–120)
ALT FLD-CCNC: 29 U/L — SIGNIFICANT CHANGE UP (ref 10–45)
ANION GAP SERPL CALC-SCNC: 13 MMOL/L — SIGNIFICANT CHANGE UP (ref 5–17)
AST SERPL-CCNC: 22 U/L — SIGNIFICANT CHANGE UP (ref 10–40)
BASOPHILS # BLD AUTO: 0 K/UL — SIGNIFICANT CHANGE UP (ref 0–0.2)
BASOPHILS NFR BLD AUTO: 0 % — SIGNIFICANT CHANGE UP (ref 0–2)
BILIRUB SERPL-MCNC: 0.2 MG/DL — SIGNIFICANT CHANGE UP (ref 0.2–1.2)
BUN SERPL-MCNC: 23 MG/DL — SIGNIFICANT CHANGE UP (ref 7–23)
CALCIUM SERPL-MCNC: 9.1 MG/DL — SIGNIFICANT CHANGE UP (ref 8.4–10.5)
CHLORIDE SERPL-SCNC: 104 MMOL/L — SIGNIFICANT CHANGE UP (ref 96–108)
CO2 SERPL-SCNC: 25 MMOL/L — SIGNIFICANT CHANGE UP (ref 22–31)
CREAT SERPL-MCNC: 0.91 MG/DL — SIGNIFICANT CHANGE UP (ref 0.5–1.3)
EGFR: 66 ML/MIN/1.73M2 — SIGNIFICANT CHANGE UP
EOSINOPHIL # BLD AUTO: 0.13 K/UL — SIGNIFICANT CHANGE UP (ref 0–0.5)
EOSINOPHIL NFR BLD AUTO: 1 % — SIGNIFICANT CHANGE UP (ref 0–6)
GLUCOSE SERPL-MCNC: 93 MG/DL — SIGNIFICANT CHANGE UP (ref 70–99)
HCT VFR BLD CALC: 37.5 % — SIGNIFICANT CHANGE UP (ref 34.5–45)
HGB BLD-MCNC: 12.5 G/DL — SIGNIFICANT CHANGE UP (ref 11.5–15.5)
LYMPHOCYTES # BLD AUTO: 1.07 K/UL — SIGNIFICANT CHANGE UP (ref 1–3.3)
LYMPHOCYTES # BLD AUTO: 8 % — LOW (ref 13–44)
MCHC RBC-ENTMCNC: 30 PG — SIGNIFICANT CHANGE UP (ref 27–34)
MCHC RBC-ENTMCNC: 33.3 G/DL — SIGNIFICANT CHANGE UP (ref 32–36)
MCV RBC AUTO: 89.9 FL — SIGNIFICANT CHANGE UP (ref 80–100)
METAMYELOCYTES # FLD: 1 % — HIGH (ref 0–0)
MONOCYTES # BLD AUTO: 0.27 K/UL — SIGNIFICANT CHANGE UP (ref 0–0.9)
MONOCYTES NFR BLD AUTO: 2 % — SIGNIFICANT CHANGE UP (ref 2–14)
MYELOCYTES NFR BLD: 2 % — HIGH (ref 0–0)
NEUTROPHILS # BLD AUTO: 11.5 K/UL — HIGH (ref 1.8–7.4)
NEUTROPHILS NFR BLD AUTO: 86 % — HIGH (ref 43–77)
NRBC # BLD: 0 /100 — SIGNIFICANT CHANGE UP (ref 0–0)
NRBC # BLD: SIGNIFICANT CHANGE UP /100 WBCS (ref 0–0)
PLAT MORPH BLD: NORMAL — SIGNIFICANT CHANGE UP
PLATELET # BLD AUTO: 246 K/UL — SIGNIFICANT CHANGE UP (ref 150–400)
POTASSIUM SERPL-MCNC: 5.1 MMOL/L — SIGNIFICANT CHANGE UP (ref 3.5–5.3)
POTASSIUM SERPL-SCNC: 5.1 MMOL/L — SIGNIFICANT CHANGE UP (ref 3.5–5.3)
PROT SERPL-MCNC: 6.9 G/DL — SIGNIFICANT CHANGE UP (ref 6–8.3)
RBC # BLD: 4.17 M/UL — SIGNIFICANT CHANGE UP (ref 3.8–5.2)
RBC # FLD: 13.7 % — SIGNIFICANT CHANGE UP (ref 10.3–14.5)
RBC BLD AUTO: SIGNIFICANT CHANGE UP
SODIUM SERPL-SCNC: 142 MMOL/L — SIGNIFICANT CHANGE UP (ref 135–145)
WBC # BLD: 13.37 K/UL — HIGH (ref 3.8–10.5)
WBC # FLD AUTO: 13.37 K/UL — HIGH (ref 3.8–10.5)

## 2022-03-16 PROCEDURE — 99215 OFFICE O/P EST HI 40 MIN: CPT

## 2022-03-16 NOTE — HISTORY OF PRESENT ILLNESS
[T: ___] : T[unfilled] [N: ___] : N[unfilled] [M: ___] : M[unfilled] [AJCC Stage: ____] : AJCC Stage: [unfilled] [5 - Distress Level] : Distress Level: 5 [Patient given social work contact information and resource sheet] : Patient was given social work contact information and resource sheet [de-identified] : Ms.Ruth Crowe  is a 76 year old female here for an evaluation of breast cancer. Her oncologic history is as follows:\par \par She has a prior history of a benign excisional biopsy of the left breast in 2013, right breast in 1999 and an additional excisional biopsy with another surgeon at an unspecified time. Pathology demonstrated atypia but she has never been diagnosed with cancer. \par \par She underwent routine breast imaging on 5/21/2021(BIRADS 0) which showed  small asymmetry was seen in the central left breast for which additional views are recommended. There are questionable small nodules at the 3:00, 5:00 and 11:00 positions of the left breast on ultrasound for which a targeted ultrasound was recommended  \par Additional left breast mammo/sono on 5/31/21( BIRADS 3) revealed persistent small nodular asymmetry in the central posterior likely medial left breast by mammography, likely corresponding to a probable cyst at 10:00 on sonography. Follow-up left diagnostic mammography and targeted left breast sonography in 6 months are recommended to confirm stability of these findings.There is an additional small nodule in the upper left breast on mammo, with no sonogram correlate. for which 6 months follow-up left diagnostic mammography is recommended. There are probably benign findings on US at the 5:00 and 11:00 left breast for which follow-up targeted left breast sonography in 6 months is recommended.\par Left-sided mammogram and sonogram was performed in 11/17/2021( BI-RADS 4B) There is a 3 mm cyst vs.hypoechoic nodule in the left breast at 9:00, 6 cmfn, corresponding to a stable circumscribed nodule on mammogram. This appears somewhat irregular on sonography and ultrasound-guided biopsy is recommended.\par \par She underwent  left breast, 9 o 'clock, ultrasound guided core biopsy core biopsy on 11/19/2021 which showed Invasive poorly differentiated ductal carcinoma with prominent\par lymphocytic infiltrate,Adrian score 8/9, measuring 0.3 cm, ER:Negative, 0%, PgR:Negative, 0%, HER-2:Negative. No Ductal carcinoma in situ or microcalcifications present\par No lymphovascular permeation seen.\par She underwent a breast MRI on  12/02/2021(BI-RADS 4A) which showed recently diagnosed left breast malignancy, a non-mass enhancement in the left outer retroareolar breast, possibly corresponding to a finding seen on ultrasound at 3:00 retroareolar for which targeted left breast ultrasound and ultrasound-guided core needle biopsy is recommended.In addition, in view of the multiple bilateral enhancing foci and the presence of multiple masses on previous ultrasound studies, bilateral ultrasound is recommended at this time, to determine if any other masses in either breast demonstrate indeterminate or suspicious morphology warranting biopsy.\par \par She had BL US on  12/09/2021(BIRADS 6) which showed a left breast 9:00 6 cm from the nipple 0.3 cm irregular shaped hypoechoic mass at site of biopsy-proven invasive cancer. There is a left breast 3:00 retroareolar bilobed hypoechoic nodule/complicated cyst which correlates with the MRI finding for which ultrasound-guided core biopsy is rec.\par There is a right breast 12:00 1 cm from the nipple 0.3 cm round irregular shaped hypoechoic mass with indistinct margins. for which for ultrasound-guided core biopsy is rec.\par \par  She underwent bilateral breasts us core biopsy on 12/9/2021 the right breast 12 o'clock 1 cmfn biopsy revealed proliferative fibrocystic changes with focal moderate to florid usual ductal epithelial hyperplasia,duct ectasia showing two ducts with attenuated epithelial lining,thin fibrotic walls and surrounding mild chronic inflammation The left breast, retroareolar biopsy at 3:00 revealed proliferative fibrocystic changes with moderate to florid usual ductal epithelial hyperplasia, cyst formation and apocrine metaplasia.\par \par She underwent left breast lumpectomy on  1/13/2022 which revealed invasive poorly differentiated ductal carcinoma with prominent chronic inflammatory infiltrate,  [place] grade 3, measuring 0.5 cm, Margin were negative. Small focus of Lymphovascular invasion was noted. 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 She is Ashkenazi [employer].\par \par I discussed natural history and treatment options for early stage triple negative breast cancer.  Adjuvant chemotherapy is typically recommended for tumor size 6 mm or more. Triple negative breast cancer is aggressive and has a high risk for recurrence therefore chemotherapy can be considered for smaller T1 a tumors as well. She has LVI and therefore chemotherapy can be considered. Patient has very good organ function and performance status despite her age and is very highly motivated to get aggressive treatments. Patient wants to go ahead with the best possible chemotherapy option. I emphasized that Adriamycin based chemotherapy will be an overkill for T1 a tumor as well as will not be tolerable at her age therefore is not recommended. Patient said "age is just a number" and she is not ready to go anytime soon. She is very motivated to start chemotherapy ASAP. Patient reports that she came with an understanding that chemotherapy is on the table. \par \par Patient is very motivated and wants to get aggressive treatment upfront. She is willing to take chemotherapy with a good understanding of risks and benefits. We discussed that dose dense CMF will be appropriate adjuvant chemotherapy regimen for her with tolerable toxicity. Chemotherapy dose and schedule can be modified based on her tolerance as well.\par \par We discussed the data for adjuvant CMF. Adjuvant CMF is equivalent to AC x4 in terms of disease-free survival and overall survival based on randomized clinical trial data, but there is no direct comparison of ddACT or TC with CMF. Dose dense CMF was studied in a small feasibility study and we will follow the same protocol. (Kemi et al, -Clin Breast Cancer. 2010 Dec 1; 10(6): 651302)  <https://www.ncbi.nlm.nih.gov/entrez/eutils/elink.fcgi?dbfrom=pubmed&retmode=ref&cmd=prlinks&cn=94487575>.\par \par We discussed expected and unexpected side effects of chemotherapy, including but not limited to hair loss (minimal), fatigue, change in taste, mouth sores, nausea, vomiting, diarrhea, infusion reaction, allergic reaction, significant myelosuppression, need for blood transfusion, infection, bleeding, neuropathy, chemical cystitis, kidney injury, nerve pain, muscle pain and detrimental effect on liver function. Signed an informed consent after complete understanding of the risks, benefits and alternatives. Patient reports she has fatty liver. LFTs today are normal. We will keep an eye on her LFTs during chemotherapy.\par \par She asked other appropriate questions including the role of PARP inhibitors and immunotherapy. We discussed that she is not a candidate for Parp inhibitors in the adjuvant setting given the small disease. BRCA gene testing is pending. We reviewed the role of immunotherapy in the neoadjuvant setting followed by adjuvant setting for large node positive triple negative tumors. She also inquired the role of platinum therapy. We discussed toxicity associated with it and again no benefit in the adjuvant setting. We also discussed role of androgen receptor in triple negative breast cancer patients. We will ask pathology to check androgen receptor although the therapy is not indicated in the adjuvant setting.\par \par She is a candidate for radiation therapy. \par \par \par PET CT 02/04/2022 Mildly FDG avid amorphous soft tissue in the medial aspect of the left breast and mildly FDG avid soft tissue and low-attenuation density in the left axilla likely represents post surgical changes. Please correlate clinically and with follow-up study. No evidence of metastatic disease.\par 2. Nonspecific minimally FDG avid nodular soft tissue within the right breast, likely representing post biopsy changes. Correlate clinically\par 3. Nonspecific hypermetabolism in the right colon with no definite abnormality on CT. Please correlate clinically or with colonoscopy as warranted.\par Patient recently had colonoscopy she will f/u with GI prn\par EKG done , Chemo consent obtained \par Patient is a anxious about stating chemo today\par Emotional support given [de-identified] : Ms. 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 She is s/p lumpectomy on 1/13/2022. Tumor size is 5 mm, 2 sentinel lymph nodes are negative but focal lymphovascular invasion was noted. ddCMF started 2/16/22. PET 2/2022 PARVEZ\par \par Chemo # 3 CMF onpro\par She reports mild-moderate fatigue and altered taste x 3-4 days after chemo. She was able to function, maintained PO intake, weight stable. She had mild nausea, took Reglan, no vomiting, no diarrhea or mouth sores. No Bone pain, no neuropathy, no fevers\par She has mild headache, resolved w/o meds. Mild hair thinning

## 2022-03-16 NOTE — ASSESSMENT
[Curative] : Goals of care discussed with patient: Curative [FreeTextEntry1] : Ms. 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 She is s/p lumpectomy on 1/13/2022. Tumor size is 5 mm, 2 sentinel lymph nodes are negative but focal lymphovascular invasion was noted. Adj CMF started 2/16/2022. PET PARVEZ 2/2022\par \par - Breast ca: On ddCMF chemo, C # 3/8,  3/16/22 \par Patient is on cytotoxic chemotherapy and needs intensive monitoring for severe toxicity.\par CBC reviewed with the patient today to make sure she is not neutropenic from high risk cancer therapy drug.  We will continue to monitor CBC every 2 to 3 weeks for intense drug monitoring. Counts good, will proceed with tx.\par - Chemo induced anemia-  No transfusion needed. Monitor counts\par - Mild leukocytosis secondary to ONPRO. Check cbc each cycle\par - Chemo induced diarrhea-  Imodium PRN. Maintain PO hydration. BRAT diet\par - Chemo induced N/V- Will get premedications with Emend, dexamethasone and Aloxi. She will continue dexamethasone for next 3 days for antinausea prophylaxis. She will use Reglan as needed. \par - GF induced bone pain- She will take Claritin\par - Mucositis- mild mouth sores , continue biotene  if worsens will start first mouthwash . \par - Chemo induced dysgeusia and fatigue: Encourage p.o. fluids, small frequent meals.\par - Instructed to call office and go directly to the emergency room with fever more than 100.4, shaking chills, productive cough, sore throat, shortness of breath or urinary symptoms. Patient verbalized understanding and agreement.\par - Emotional support provided, all questions answered.\par RTO 2 weeks\par

## 2022-03-16 NOTE — PHYSICAL EXAM
[Fully active, able to carry on all pre-disease performance without restriction] : Status 0 - Fully active, able to carry on all pre-disease performance without restriction [Normal] : affect appropriate [de-identified] : healed axillary and lumpectomy scar

## 2022-03-30 ENCOUNTER — APPOINTMENT (OUTPATIENT)
Dept: INFUSION THERAPY | Facility: HOSPITAL | Age: 77
End: 2022-03-30

## 2022-03-30 ENCOUNTER — RESULT REVIEW (OUTPATIENT)
Age: 77
End: 2022-03-30

## 2022-03-30 ENCOUNTER — APPOINTMENT (OUTPATIENT)
Dept: HEMATOLOGY ONCOLOGY | Facility: CLINIC | Age: 77
End: 2022-03-30
Payer: MEDICARE

## 2022-03-30 VITALS
HEIGHT: 62.2 IN | TEMPERATURE: 97.3 F | BODY MASS INDEX: 35.25 KG/M2 | WEIGHT: 193.98 LBS | OXYGEN SATURATION: 98 % | DIASTOLIC BLOOD PRESSURE: 78 MMHG | RESPIRATION RATE: 16 BRPM | HEART RATE: 75 BPM | SYSTOLIC BLOOD PRESSURE: 126 MMHG

## 2022-03-30 LAB
BASOPHILS # BLD AUTO: 0.18 K/UL — SIGNIFICANT CHANGE UP (ref 0–0.2)
BASOPHILS NFR BLD AUTO: 1.1 % — SIGNIFICANT CHANGE UP (ref 0–2)
EOSINOPHIL # BLD AUTO: 0.13 K/UL — SIGNIFICANT CHANGE UP (ref 0–0.5)
EOSINOPHIL NFR BLD AUTO: 0.8 % — SIGNIFICANT CHANGE UP (ref 0–6)
HCT VFR BLD CALC: 36.9 % — SIGNIFICANT CHANGE UP (ref 34.5–45)
HGB BLD-MCNC: 12.1 G/DL — SIGNIFICANT CHANGE UP (ref 11.5–15.5)
IMM GRANULOCYTES NFR BLD AUTO: 8.3 % — HIGH (ref 0–1.5)
LYMPHOCYTES # BLD AUTO: 1.72 K/UL — SIGNIFICANT CHANGE UP (ref 1–3.3)
LYMPHOCYTES # BLD AUTO: 10.5 % — LOW (ref 13–44)
MCHC RBC-ENTMCNC: 29.7 PG — SIGNIFICANT CHANGE UP (ref 27–34)
MCHC RBC-ENTMCNC: 32.8 G/DL — SIGNIFICANT CHANGE UP (ref 32–36)
MCV RBC AUTO: 90.4 FL — SIGNIFICANT CHANGE UP (ref 80–100)
MONOCYTES # BLD AUTO: 0.69 K/UL — SIGNIFICANT CHANGE UP (ref 0–0.9)
MONOCYTES NFR BLD AUTO: 4.2 % — SIGNIFICANT CHANGE UP (ref 2–14)
NEUTROPHILS # BLD AUTO: 12.36 K/UL — HIGH (ref 1.8–7.4)
NEUTROPHILS NFR BLD AUTO: 75.1 % — SIGNIFICANT CHANGE UP (ref 43–77)
NRBC # BLD: 0 /100 WBCS — SIGNIFICANT CHANGE UP (ref 0–0)
PLATELET # BLD AUTO: 244 K/UL — SIGNIFICANT CHANGE UP (ref 150–400)
RBC # BLD: 4.08 M/UL — SIGNIFICANT CHANGE UP (ref 3.8–5.2)
RBC # FLD: 14.3 % — SIGNIFICANT CHANGE UP (ref 10.3–14.5)
WBC # BLD: 16.45 K/UL — HIGH (ref 3.8–10.5)
WBC # FLD AUTO: 16.45 K/UL — HIGH (ref 3.8–10.5)

## 2022-03-30 PROCEDURE — 99215 OFFICE O/P EST HI 40 MIN: CPT

## 2022-03-30 RX ORDER — FAMOTIDINE 10 MG/ML
0 INJECTION INTRAVENOUS
Qty: 0 | Refills: 0 | DISCHARGE

## 2022-03-30 RX ORDER — GLUCOSAMINE HCL/CHONDROITIN SU 500-400 MG
0 CAPSULE ORAL
Qty: 0 | Refills: 0 | DISCHARGE

## 2022-03-30 RX ORDER — CALCIUM CARBONATE 500(1250)
0 TABLET ORAL
Qty: 0 | Refills: 0 | DISCHARGE

## 2022-03-30 RX ORDER — SIMVASTATIN 20 MG/1
1 TABLET, FILM COATED ORAL
Qty: 0 | Refills: 0 | DISCHARGE

## 2022-03-30 RX ORDER — CHOLECALCIFEROL (VITAMIN D3) 125 MCG
1 CAPSULE ORAL
Qty: 0 | Refills: 0 | DISCHARGE

## 2022-03-30 RX ORDER — LEVOTHYROXINE SODIUM 125 MCG
0 TABLET ORAL
Qty: 0 | Refills: 0 | DISCHARGE

## 2022-03-30 NOTE — PHYSICAL EXAM
[Fully active, able to carry on all pre-disease performance without restriction] : Status 0 - Fully active, able to carry on all pre-disease performance without restriction [Normal] : affect appropriate [de-identified] : healed axillary and lumpectomy scar

## 2022-03-30 NOTE — HISTORY OF PRESENT ILLNESS
[T: ___] : T[unfilled] [N: ___] : N[unfilled] [M: ___] : M[unfilled] [AJCC Stage: ____] : AJCC Stage: [unfilled] [5 - Distress Level] : Distress Level: 5 [Patient given social work contact information and resource sheet] : Patient was given social work contact information and resource sheet [de-identified] : Ms.Ruth Crowe  is a 76 year old female here for an evaluation of breast cancer. Her oncologic history is as follows:\par \par She has a prior history of a benign excisional biopsy of the left breast in 2013, right breast in 1999 and an additional excisional biopsy with another surgeon at an unspecified time. Pathology demonstrated atypia but she has never been diagnosed with cancer. \par \par She underwent routine breast imaging on 5/21/2021(BIRADS 0) which showed  small asymmetry was seen in the central left breast for which additional views are recommended. There are questionable small nodules at the 3:00, 5:00 and 11:00 positions of the left breast on ultrasound for which a targeted ultrasound was recommended  \par Additional left breast mammo/sono on 5/31/21( BIRADS 3) revealed persistent small nodular asymmetry in the central posterior likely medial left breast by mammography, likely corresponding to a probable cyst at 10:00 on sonography. Follow-up left diagnostic mammography and targeted left breast sonography in 6 months are recommended to confirm stability of these findings.There is an additional small nodule in the upper left breast on mammo, with no sonogram correlate. for which 6 months follow-up left diagnostic mammography is recommended. There are probably benign findings on US at the 5:00 and 11:00 left breast for which follow-up targeted left breast sonography in 6 months is recommended.\par Left-sided mammogram and sonogram was performed in 11/17/2021( BI-RADS 4B) There is a 3 mm cyst vs.hypoechoic nodule in the left breast at 9:00, 6 cmfn, corresponding to a stable circumscribed nodule on mammogram. This appears somewhat irregular on sonography and ultrasound-guided biopsy is recommended.\par \par She underwent  left breast, 9 o 'clock, ultrasound guided core biopsy core biopsy on 11/19/2021 which showed Invasive poorly differentiated ductal carcinoma with prominent\par lymphocytic infiltrate,Adrian score 8/9, measuring 0.3 cm, ER:Negative, 0%, PgR:Negative, 0%, HER-2:Negative. No Ductal carcinoma in situ or microcalcifications present\par No lymphovascular permeation seen.\par She underwent a breast MRI on  12/02/2021(BI-RADS 4A) which showed recently diagnosed left breast malignancy, a non-mass enhancement in the left outer retroareolar breast, possibly corresponding to a finding seen on ultrasound at 3:00 retroareolar for which targeted left breast ultrasound and ultrasound-guided core needle biopsy is recommended.In addition, in view of the multiple bilateral enhancing foci and the presence of multiple masses on previous ultrasound studies, bilateral ultrasound is recommended at this time, to determine if any other masses in either breast demonstrate indeterminate or suspicious morphology warranting biopsy.\par \par She had BL US on  12/09/2021(BIRADS 6) which showed a left breast 9:00 6 cm from the nipple 0.3 cm irregular shaped hypoechoic mass at site of biopsy-proven invasive cancer. There is a left breast 3:00 retroareolar bilobed hypoechoic nodule/complicated cyst which correlates with the MRI finding for which ultrasound-guided core biopsy is rec.\par There is a right breast 12:00 1 cm from the nipple 0.3 cm round irregular shaped hypoechoic mass with indistinct margins. for which for ultrasound-guided core biopsy is rec.\par \par  She underwent bilateral breasts us core biopsy on 12/9/2021 the right breast 12 o'clock 1 cmfn biopsy revealed proliferative fibrocystic changes with focal moderate to florid usual ductal epithelial hyperplasia,duct ectasia showing two ducts with attenuated epithelial lining,thin fibrotic walls and surrounding mild chronic inflammation The left breast, retroareolar biopsy at 3:00 revealed proliferative fibrocystic changes with moderate to florid usual ductal epithelial hyperplasia, cyst formation and apocrine metaplasia.\par \par She underwent left breast lumpectomy on  1/13/2022 which revealed invasive poorly differentiated ductal carcinoma with prominent chronic inflammatory infiltrate,  [place] grade 3, measuring 0.5 cm, Margin were negative. Small focus of Lymphovascular invasion was noted. 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 She is Ashkenazi [employer].\par \par I discussed natural history and treatment options for early stage triple negative breast cancer.  Adjuvant chemotherapy is typically recommended for tumor size 6 mm or more. Triple negative breast cancer is aggressive and has a high risk for recurrence therefore chemotherapy can be considered for smaller T1 a tumors as well. She has LVI and therefore chemotherapy can be considered. Patient has very good organ function and performance status despite her age and is very highly motivated to get aggressive treatments. Patient wants to go ahead with the best possible chemotherapy option. I emphasized that Adriamycin based chemotherapy will be an overkill for T1 a tumor as well as will not be tolerable at her age therefore is not recommended. Patient said "age is just a number" and she is not ready to go anytime soon. She is very motivated to start chemotherapy ASAP. Patient reports that she came with an understanding that chemotherapy is on the table. \par \par Patient is very motivated and wants to get aggressive treatment upfront. She is willing to take chemotherapy with a good understanding of risks and benefits. We discussed that dose dense CMF will be appropriate adjuvant chemotherapy regimen for her with tolerable toxicity. Chemotherapy dose and schedule can be modified based on her tolerance as well.\par \par We discussed the data for adjuvant CMF. Adjuvant CMF is equivalent to AC x4 in terms of disease-free survival and overall survival based on randomized clinical trial data, but there is no direct comparison of ddACT or TC with CMF. Dose dense CMF was studied in a small feasibility study and we will follow the same protocol. (Kemi et al, -Clin Breast Cancer. 2010 Dec 1; 10(6): 839357)  <https://www.ncbi.nlm.nih.gov/entrez/eutils/elink.fcgi?dbfrom=pubmed&retmode=ref&cmd=prlinks&lf=97556672>.\par \par We discussed expected and unexpected side effects of chemotherapy, including but not limited to hair loss (minimal), fatigue, change in taste, mouth sores, nausea, vomiting, diarrhea, infusion reaction, allergic reaction, significant myelosuppression, need for blood transfusion, infection, bleeding, neuropathy, chemical cystitis, kidney injury, nerve pain, muscle pain and detrimental effect on liver function. Signed an informed consent after complete understanding of the risks, benefits and alternatives. Patient reports she has fatty liver. LFTs today are normal. We will keep an eye on her LFTs during chemotherapy.\par \par She asked other appropriate questions including the role of PARP inhibitors and immunotherapy. We discussed that she is not a candidate for Parp inhibitors in the adjuvant setting given the small disease. BRCA gene testing is pending. We reviewed the role of immunotherapy in the neoadjuvant setting followed by adjuvant setting for large node positive triple negative tumors. She also inquired the role of platinum therapy. We discussed toxicity associated with it and again no benefit in the adjuvant setting. We also discussed role of androgen receptor in triple negative breast cancer patients. We will ask pathology to check androgen receptor although the therapy is not indicated in the adjuvant setting.\par \par She is a candidate for radiation therapy. \par \par \par PET CT 02/04/2022 Mildly FDG avid amorphous soft tissue in the medial aspect of the left breast and mildly FDG avid soft tissue and low-attenuation density in the left axilla likely represents post surgical changes. Please correlate clinically and with follow-up study. No evidence of metastatic disease.\par 2. Nonspecific minimally FDG avid nodular soft tissue within the right breast, likely representing post biopsy changes. Correlate clinically\par 3. Nonspecific hypermetabolism in the right colon with no definite abnormality on CT. Please correlate clinically or with colonoscopy as warranted.\par Patient recently had colonoscopy she will f/u with GI prn\par EKG done , Chemo consent obtained \par Patient is a anxious about stating chemo today\par Emotional support given [de-identified] : Ms. 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 She is s/p lumpectomy on 1/13/2022. Tumor size is 5 mm, 2 sentinel lymph nodes are negative but focal lymphovascular invasion was noted. ddCMF started 2/16/22. PET 2/2022 PARVEZ\par \par Chemo # 4 CMF onpro\par She reports mild-moderate fatigue and altered taste x 3-4 days after chemo. She was able to function, maintained PO intake, weight stable. She had mild nausea, took Reglan, no vomiting, no diarrhea or mouth sores. No Bone pain, no neuropathy, no fevers\par She has mild headache, resolved w/o meds. Mild hair thinning

## 2022-03-30 NOTE — ASSESSMENT
[Curative] : Goals of care discussed with patient: Curative [FreeTextEntry1] : Ms. 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 She is s/p lumpectomy on 1/13/2022. Tumor size is 5 mm, 2 sentinel lymph nodes are negative but focal lymphovascular invasion was noted. Adj CMF started 2/16/2022. PET PARVEZ 2/2022\par \par - Breast ca: On ddCMF chemo, C # 4/8,  3/30/22\par Patient is on cytotoxic chemotherapy and needs intensive monitoring for severe toxicity.\par CBC reviewed with the patient today to make sure she is not neutropenic from high risk cancer therapy drug.  We will continue to monitor CBC every 2 to 3 weeks for intense drug monitoring. Counts good, will proceed with tx\par - RT with Dr Beach after chemo.\par - Chemo induced anemia-  No transfusion needed. Monitor counts\par - Mild leukocytosis secondary to ONPRO. Check cbc each cycle\par - Chemo induced diarrhea-  Imodium PRN. Maintain PO hydration. BRAT diet\par - Chemo induced N/V- Will get premedications with Emend, dexamethasone and Aloxi. She will continue dexamethasone for next 3 days for antinausea prophylaxis. She will use Reglan as needed. \par - GF induced bone pain- She will take Claritin\par - Mucositis- mild mouth sores , continue biotene  if worsens will start first mouthwash . \par - Chemo induced dysgeusia and fatigue: Encourage p.o. fluids, small frequent meals.\par - Instructed to call office and go directly to the emergency room with fever more than 100.4, shaking chills, productive cough, sore throat, shortness of breath or urinary symptoms. Patient verbalized understanding and agreement.\par - Emotional support provided, all questions answered.\par RTO 2 weeks\par CHEMO ordered in [facility]\par

## 2022-03-31 ENCOUNTER — NON-APPOINTMENT (OUTPATIENT)
Age: 77
End: 2022-03-31

## 2022-03-31 LAB
ALBUMIN SERPL ELPH-MCNC: 4.2 G/DL — SIGNIFICANT CHANGE UP (ref 3.3–5)
ALP SERPL-CCNC: 113 U/L — SIGNIFICANT CHANGE UP (ref 40–120)
ALT FLD-CCNC: 24 U/L — SIGNIFICANT CHANGE UP (ref 10–45)
ANION GAP SERPL CALC-SCNC: 12 MMOL/L — SIGNIFICANT CHANGE UP (ref 5–17)
AST SERPL-CCNC: 20 U/L — SIGNIFICANT CHANGE UP (ref 10–40)
BILIRUB SERPL-MCNC: 0.2 MG/DL — SIGNIFICANT CHANGE UP (ref 0.2–1.2)
BUN SERPL-MCNC: 18 MG/DL — SIGNIFICANT CHANGE UP (ref 7–23)
CALCIUM SERPL-MCNC: 8.6 MG/DL — SIGNIFICANT CHANGE UP (ref 8.4–10.5)
CHLORIDE SERPL-SCNC: 105 MMOL/L — SIGNIFICANT CHANGE UP (ref 96–108)
CO2 SERPL-SCNC: 26 MMOL/L — SIGNIFICANT CHANGE UP (ref 22–31)
CREAT SERPL-MCNC: 0.95 MG/DL — SIGNIFICANT CHANGE UP (ref 0.5–1.3)
EGFR: 62 ML/MIN/1.73M2 — SIGNIFICANT CHANGE UP
GLUCOSE SERPL-MCNC: 63 MG/DL — LOW (ref 70–99)
POTASSIUM SERPL-MCNC: 5.1 MMOL/L — SIGNIFICANT CHANGE UP (ref 3.5–5.3)
POTASSIUM SERPL-SCNC: 5.1 MMOL/L — SIGNIFICANT CHANGE UP (ref 3.5–5.3)
PROT SERPL-MCNC: 6.4 G/DL — SIGNIFICANT CHANGE UP (ref 6–8.3)
SODIUM SERPL-SCNC: 143 MMOL/L — SIGNIFICANT CHANGE UP (ref 135–145)

## 2022-04-07 ENCOUNTER — OUTPATIENT (OUTPATIENT)
Dept: OUTPATIENT SERVICES | Facility: HOSPITAL | Age: 77
LOS: 1 days | Discharge: HOME | End: 2022-04-07

## 2022-04-07 DIAGNOSIS — Z90.722 ACQUIRED ABSENCE OF OVARIES, BILATERAL: Chronic | ICD-10-CM

## 2022-04-07 DIAGNOSIS — D64.9 ANEMIA, UNSPECIFIED: ICD-10-CM

## 2022-04-07 DIAGNOSIS — Z98.890 OTHER SPECIFIED POSTPROCEDURAL STATES: Chronic | ICD-10-CM

## 2022-04-13 ENCOUNTER — APPOINTMENT (OUTPATIENT)
Dept: HEMATOLOGY ONCOLOGY | Facility: CLINIC | Age: 77
End: 2022-04-13

## 2022-04-13 ENCOUNTER — RESULT REVIEW (OUTPATIENT)
Age: 77
End: 2022-04-13

## 2022-04-13 ENCOUNTER — APPOINTMENT (OUTPATIENT)
Dept: HEMATOLOGY ONCOLOGY | Facility: CLINIC | Age: 77
End: 2022-04-13
Payer: MEDICARE

## 2022-04-13 ENCOUNTER — APPOINTMENT (OUTPATIENT)
Dept: INFUSION THERAPY | Facility: HOSPITAL | Age: 77
End: 2022-04-13

## 2022-04-13 VITALS
OXYGEN SATURATION: 96 % | SYSTOLIC BLOOD PRESSURE: 137 MMHG | WEIGHT: 193.54 LBS | TEMPERATURE: 97 F | DIASTOLIC BLOOD PRESSURE: 75 MMHG | HEIGHT: 62.17 IN | BODY MASS INDEX: 35.17 KG/M2 | HEART RATE: 80 BPM | RESPIRATION RATE: 16 BRPM

## 2022-04-13 DIAGNOSIS — R11.2 NAUSEA WITH VOMITING, UNSPECIFIED: ICD-10-CM

## 2022-04-13 DIAGNOSIS — R74.01 ELEVATION OF LEVELS OF LIVER TRANSAMINASE LEVELS: ICD-10-CM

## 2022-04-13 DIAGNOSIS — Z51.11 ENCOUNTER FOR ANTINEOPLASTIC CHEMOTHERAPY: ICD-10-CM

## 2022-04-13 DIAGNOSIS — Z51.89 ENCOUNTER FOR OTHER SPECIFIED AFTERCARE: ICD-10-CM

## 2022-04-13 DIAGNOSIS — C50.919 MALIGNANT NEOPLASM OF UNSPECIFIED SITE OF UNSPECIFIED FEMALE BREAST: ICD-10-CM

## 2022-04-13 LAB
ALBUMIN SERPL ELPH-MCNC: 4.6 G/DL — SIGNIFICANT CHANGE UP (ref 3.3–5)
ALP SERPL-CCNC: 137 U/L — HIGH (ref 40–120)
ALT FLD-CCNC: 23 U/L — SIGNIFICANT CHANGE UP (ref 10–45)
ANION GAP SERPL CALC-SCNC: 12 MMOL/L — SIGNIFICANT CHANGE UP (ref 5–17)
AST SERPL-CCNC: 22 U/L — SIGNIFICANT CHANGE UP (ref 10–40)
BASOPHILS # BLD AUTO: 0 K/UL — SIGNIFICANT CHANGE UP (ref 0–0.2)
BASOPHILS NFR BLD AUTO: 0 % — SIGNIFICANT CHANGE UP (ref 0–2)
BILIRUB SERPL-MCNC: 0.2 MG/DL — SIGNIFICANT CHANGE UP (ref 0.2–1.2)
BUN SERPL-MCNC: 17 MG/DL — SIGNIFICANT CHANGE UP (ref 7–23)
CALCIUM SERPL-MCNC: 9.3 MG/DL — SIGNIFICANT CHANGE UP (ref 8.4–10.5)
CHLORIDE SERPL-SCNC: 102 MMOL/L — SIGNIFICANT CHANGE UP (ref 96–108)
CO2 SERPL-SCNC: 26 MMOL/L — SIGNIFICANT CHANGE UP (ref 22–31)
CREAT SERPL-MCNC: 1.01 MG/DL — SIGNIFICANT CHANGE UP (ref 0.5–1.3)
EGFR: 58 ML/MIN/1.73M2 — LOW
EOSINOPHIL # BLD AUTO: 0 K/UL — SIGNIFICANT CHANGE UP (ref 0–0.5)
EOSINOPHIL NFR BLD AUTO: 0 % — SIGNIFICANT CHANGE UP (ref 0–6)
GLUCOSE SERPL-MCNC: 101 MG/DL — HIGH (ref 70–99)
HCT VFR BLD CALC: 37.2 % — SIGNIFICANT CHANGE UP (ref 34.5–45)
HGB BLD-MCNC: 12 G/DL — SIGNIFICANT CHANGE UP (ref 11.5–15.5)
LYMPHOCYTES # BLD AUTO: 1.57 K/UL — SIGNIFICANT CHANGE UP (ref 1–3.3)
LYMPHOCYTES # BLD AUTO: 10 % — LOW (ref 13–44)
MCHC RBC-ENTMCNC: 29.5 PG — SIGNIFICANT CHANGE UP (ref 27–34)
MCHC RBC-ENTMCNC: 32.3 G/DL — SIGNIFICANT CHANGE UP (ref 32–36)
MCV RBC AUTO: 91.4 FL — SIGNIFICANT CHANGE UP (ref 80–100)
METAMYELOCYTES # FLD: 1 % — HIGH (ref 0–0)
MONOCYTES # BLD AUTO: 0.79 K/UL — SIGNIFICANT CHANGE UP (ref 0–0.9)
MONOCYTES NFR BLD AUTO: 5 % — SIGNIFICANT CHANGE UP (ref 2–14)
MYELOCYTES NFR BLD: 2 % — HIGH (ref 0–0)
NEUTROPHILS # BLD AUTO: 12.9 K/UL — HIGH (ref 1.8–7.4)
NEUTROPHILS NFR BLD AUTO: 82 % — HIGH (ref 43–77)
NRBC # BLD: 0 /100 — SIGNIFICANT CHANGE UP (ref 0–0)
NRBC # BLD: SIGNIFICANT CHANGE UP /100 WBCS (ref 0–0)
PLAT MORPH BLD: NORMAL — SIGNIFICANT CHANGE UP
PLATELET # BLD AUTO: 219 K/UL — SIGNIFICANT CHANGE UP (ref 150–400)
POTASSIUM SERPL-MCNC: 4.6 MMOL/L — SIGNIFICANT CHANGE UP (ref 3.5–5.3)
POTASSIUM SERPL-SCNC: 4.6 MMOL/L — SIGNIFICANT CHANGE UP (ref 3.5–5.3)
PROT SERPL-MCNC: 7 G/DL — SIGNIFICANT CHANGE UP (ref 6–8.3)
RBC # BLD: 4.07 M/UL — SIGNIFICANT CHANGE UP (ref 3.8–5.2)
RBC # FLD: 14.6 % — HIGH (ref 10.3–14.5)
RBC BLD AUTO: SIGNIFICANT CHANGE UP
SODIUM SERPL-SCNC: 141 MMOL/L — SIGNIFICANT CHANGE UP (ref 135–145)
WBC # BLD: 15.73 K/UL — HIGH (ref 3.8–10.5)
WBC # FLD AUTO: 15.73 K/UL — HIGH (ref 3.8–10.5)

## 2022-04-13 PROCEDURE — 99215 OFFICE O/P EST HI 40 MIN: CPT

## 2022-04-14 ENCOUNTER — NON-APPOINTMENT (OUTPATIENT)
Age: 77
End: 2022-04-14

## 2022-04-21 PROBLEM — R74.01 TRANSAMINITIS: Status: ACTIVE | Noted: 2022-03-16

## 2022-04-21 NOTE — PHYSICAL EXAM
[Fully active, able to carry on all pre-disease performance without restriction] : Status 0 - Fully active, able to carry on all pre-disease performance without restriction [Normal] : bilateral breasts without nipple retraction, skin dimpling or palpable masses; the bilateral axillae are without adenopathy [de-identified] : left healed axillary and lumpectomy scar

## 2022-04-21 NOTE — ASSESSMENT
[Curative] : Goals of care discussed with patient: Curative [FreeTextEntry1] : Ms. 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 She is s/p lumpectomy on 1/13/2022. Tumor size is 5 mm, 2 sentinel lymph nodes are negative but focal lymphovascular invasion was noted. Adj CMF started 2/16/2022. PET PARVEZ 2/2022\par \par - Breast ca: On ddCMF chemo, C # 5/8, on 4/13//22 Tolerating well She reports mild lightheadedness with position change on C4 D7  most likely 2/2 dehydration B/P 137/75 encouraged fluid intake can consider IV fluids if persists or worsens.\par Patient is on cytotoxic chemotherapy and needs intensive monitoring for severe toxicity.\par CBC reviewed with the patient today to make sure she is not neutropenic from high risk cancer therapy drug.  We will continue to monitor CBC every 2 to 3 weeks for intense drug monitoring. Counts good, will proceed with tx\par - RT with Dr Beach after chemo.\par - Chemo induced anemia-  No transfusion needed. Monitor counts\par - Mild leukocytosis secondary to ONPRO. Check cbc each cycle\par - Chemo induced diarrhea-  Imodium PRN. Maintain PO hydration. BRAT diet\par - Chemo induced N/V- Will get premedications with Emend, dexamethasone and Aloxi. She will continue dexamethasone for next 3 days for antinausea prophylaxis. She will use Reglan as needed. \par - GF induced bone pain- She will take Claritin\par - Mucositis- mild mouth sores , continue biotene  if worsens will start first mouthwash . \par - Transaminitis/ Elevated ALK phos- grade 1 elevation will monitor\par - Chemo induced dysgeusia and fatigue: Encourage p.o. fluids, small frequent meals.\par - Instructed to call office and go directly to the emergency room with fever more than 100.4, shaking chills, productive cough, sore throat, shortness of breath or urinary symptoms. Patient verbalized understanding and agreement.\par - Emotional support provided, all questions answered.\par RTO 2 weeks\par CHEMO ordered in [facility]\par

## 2022-04-21 NOTE — HISTORY OF PRESENT ILLNESS
[T: ___] : T[unfilled] [N: ___] : N[unfilled] [M: ___] : M[unfilled] [AJCC Stage: ____] : AJCC Stage: [unfilled] [5 - Distress Level] : Distress Level: 5 [Patient given social work contact information and resource sheet] : Patient was given social work contact information and resource sheet [de-identified] : Ms.Ruth Crowe  is a 76 year old female here for an evaluation of breast cancer. Her oncologic history is as follows:\par \par She has a prior history of a benign excisional biopsy of the left breast in 2013, right breast in 1999 and an additional excisional biopsy with another surgeon at an unspecified time. Pathology demonstrated atypia but she has never been diagnosed with cancer. \par \par She underwent routine breast imaging on 5/21/2021(BIRADS 0) which showed  small asymmetry was seen in the central left breast for which additional views are recommended. There are questionable small nodules at the 3:00, 5:00 and 11:00 positions of the left breast on ultrasound for which a targeted ultrasound was recommended  \par Additional left breast mammo/sono on 5/31/21( BIRADS 3) revealed persistent small nodular asymmetry in the central posterior likely medial left breast by mammography, likely corresponding to a probable cyst at 10:00 on sonography. Follow-up left diagnostic mammography and targeted left breast sonography in 6 months are recommended to confirm stability of these findings.There is an additional small nodule in the upper left breast on mammo, with no sonogram correlate. for which 6 months follow-up left diagnostic mammography is recommended. There are probably benign findings on US at the 5:00 and 11:00 left breast for which follow-up targeted left breast sonography in 6 months is recommended.\par Left-sided mammogram and sonogram was performed in 11/17/2021( BI-RADS 4B) There is a 3 mm cyst vs.hypoechoic nodule in the left breast at 9:00, 6 cmfn, corresponding to a stable circumscribed nodule on mammogram. This appears somewhat irregular on sonography and ultrasound-guided biopsy is recommended.\par \par She underwent  left breast, 9 o 'clock, ultrasound guided core biopsy core biopsy on 11/19/2021 which showed Invasive poorly differentiated ductal carcinoma with prominent\par lymphocytic infiltrate,Adrian score 8/9, measuring 0.3 cm, ER:Negative, 0%, PgR:Negative, 0%, HER-2:Negative. No Ductal carcinoma in situ or microcalcifications present\par No lymphovascular permeation seen.\par She underwent a breast MRI on  12/02/2021(BI-RADS 4A) which showed recently diagnosed left breast malignancy, a non-mass enhancement in the left outer retroareolar breast, possibly corresponding to a finding seen on ultrasound at 3:00 retroareolar for which targeted left breast ultrasound and ultrasound-guided core needle biopsy is recommended.In addition, in view of the multiple bilateral enhancing foci and the presence of multiple masses on previous ultrasound studies, bilateral ultrasound is recommended at this time, to determine if any other masses in either breast demonstrate indeterminate or suspicious morphology warranting biopsy.\par \par She had BL US on  12/09/2021(BIRADS 6) which showed a left breast 9:00 6 cm from the nipple 0.3 cm irregular shaped hypoechoic mass at site of biopsy-proven invasive cancer. There is a left breast 3:00 retroareolar bilobed hypoechoic nodule/complicated cyst which correlates with the MRI finding for which ultrasound-guided core biopsy is rec.\par There is a right breast 12:00 1 cm from the nipple 0.3 cm round irregular shaped hypoechoic mass with indistinct margins. for which for ultrasound-guided core biopsy is rec.\par \par  She underwent bilateral breasts us core biopsy on 12/9/2021 the right breast 12 o'clock 1 cmfn biopsy revealed proliferative fibrocystic changes with focal moderate to florid usual ductal epithelial hyperplasia,duct ectasia showing two ducts with attenuated epithelial lining,thin fibrotic walls and surrounding mild chronic inflammation The left breast, retroareolar biopsy at 3:00 revealed proliferative fibrocystic changes with moderate to florid usual ductal epithelial hyperplasia, cyst formation and apocrine metaplasia.\par \par She underwent left breast lumpectomy on  1/13/2022 which revealed invasive poorly differentiated ductal carcinoma with prominent chronic inflammatory infiltrate,  [place] grade 3, measuring 0.5 cm, Margin were negative. Small focus of Lymphovascular invasion was noted. 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 She is Ashkenazi Mandaen.\par \par I discussed natural history and treatment options for early stage triple negative breast cancer.  Adjuvant chemotherapy is typically recommended for tumor size 6 mm or more. Triple negative breast cancer is aggressive and has a high risk for recurrence therefore chemotherapy can be considered for smaller T1 a tumors as well. She has LVI and therefore chemotherapy can be considered. Patient has very good organ function and performance status despite her age and is very highly motivated to get aggressive treatments. Patient wants to go ahead with the best possible chemotherapy option. I emphasized that Adriamycin based chemotherapy will be an overkill for T1 a tumor as well as will not be tolerable at her age therefore is not recommended. Patient said "age is just a number" and she is not ready to go anytime soon. She is very motivated to start chemotherapy ASAP. Patient reports that she came with an understanding that chemotherapy is on the table. \par \par Patient is very motivated and wants to get aggressive treatment upfront. She is willing to take chemotherapy with a good understanding of risks and benefits. We discussed that dose dense CMF will be appropriate adjuvant chemotherapy regimen for her with tolerable toxicity. Chemotherapy dose and schedule can be modified based on her tolerance as well.\par \par We discussed the data for adjuvant CMF. Adjuvant CMF is equivalent to AC x4 in terms of disease-free survival and overall survival based on randomized clinical trial data, but there is no direct comparison of ddACT or TC with CMF. Dose dense CMF was studied in a small feasibility study and we will follow the same protocol. (Kemi et al, -Clin Breast Cancer. 2010 Dec 1; 10(6): 399547)  <https://www.ncbi.nlm.nih.gov/entrez/eutils/elink.fcgi?dbfrom=pubmed&retmode=ref&cmd=prlinks&xt=94750148>.\par \par We discussed expected and unexpected side effects of chemotherapy, including but not limited to hair loss (minimal), fatigue, change in taste, mouth sores, nausea, vomiting, diarrhea, infusion reaction, allergic reaction, significant myelosuppression, need for blood transfusion, infection, bleeding, neuropathy, chemical cystitis, kidney injury, nerve pain, muscle pain and detrimental effect on liver function. Signed an informed consent after complete understanding of the risks, benefits and alternatives. Patient reports she has fatty liver. LFTs today are normal. We will keep an eye on her LFTs during chemotherapy.\par \par She asked other appropriate questions including the role of PARP inhibitors and immunotherapy. We discussed that she is not a candidate for Parp inhibitors in the adjuvant setting given the small disease. BRCA gene testing is pending. We reviewed the role of immunotherapy in the neoadjuvant setting followed by adjuvant setting for large node positive triple negative tumors. She also inquired the role of platinum therapy. We discussed toxicity associated with it and again no benefit in the adjuvant setting. We also discussed role of androgen receptor in triple negative breast cancer patients. We will ask pathology to check androgen receptor although the therapy is not indicated in the adjuvant setting.\par \par She is a candidate for radiation therapy. \par \par \par PET CT 02/04/2022 Mildly FDG avid amorphous soft tissue in the medial aspect of the left breast and mildly FDG avid soft tissue and low-attenuation density in the left axilla likely represents post surgical changes. Please correlate clinically and with follow-up study. No evidence of metastatic disease.\par 2. Nonspecific minimally FDG avid nodular soft tissue within the right breast, likely representing post biopsy changes. Correlate clinically\par 3. Nonspecific hypermetabolism in the right colon with no definite abnormality on CT. Please correlate clinically or with colonoscopy as warranted.\par Patient recently had colonoscopy she will f/u with GI prn\par EKG done , Chemo consent obtained \par Patient is a anxious about stating chemo today\par Emotional support given [de-identified] : Ms. 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 She is s/p lumpectomy on 1/13/2022. Tumor size is 5 mm, 2 sentinel lymph nodes are negative but focal lymphovascular invasion was noted. ddCMF started 2/16/22. PET 2/2022 PARVEZ\par \par Chemo # 5 CMF onpro Tolerating well, occasional lightheadedness D7 most likely 2/2 dehydration \par She reports mild-moderate fatigue and altered taste x 3-4 days after chemo. She was able to function, maintained PO intake, weight stable. She had mild nausea, took Reglan, no vomiting, no diarrhea or mouth sores. No Bone pain, no neuropathy, no fevers\par She has mild headache, resolved w/o meds. Mild hair thinning

## 2022-04-27 ENCOUNTER — RESULT REVIEW (OUTPATIENT)
Age: 77
End: 2022-04-27

## 2022-04-27 ENCOUNTER — APPOINTMENT (OUTPATIENT)
Dept: INFUSION THERAPY | Facility: HOSPITAL | Age: 77
End: 2022-04-27

## 2022-04-27 ENCOUNTER — APPOINTMENT (OUTPATIENT)
Dept: HEMATOLOGY ONCOLOGY | Facility: CLINIC | Age: 77
End: 2022-04-27
Payer: MEDICARE

## 2022-04-27 VITALS
RESPIRATION RATE: 16 BRPM | TEMPERATURE: 97.2 F | DIASTOLIC BLOOD PRESSURE: 74 MMHG | WEIGHT: 198.39 LBS | OXYGEN SATURATION: 96 % | BODY MASS INDEX: 36.05 KG/M2 | HEIGHT: 62.17 IN | HEART RATE: 91 BPM | SYSTOLIC BLOOD PRESSURE: 113 MMHG

## 2022-04-27 DIAGNOSIS — R42 DIZZINESS AND GIDDINESS: ICD-10-CM

## 2022-04-27 LAB
ALBUMIN SERPL ELPH-MCNC: 4.4 G/DL — SIGNIFICANT CHANGE UP (ref 3.3–5)
ALP SERPL-CCNC: 120 U/L — SIGNIFICANT CHANGE UP (ref 40–120)
ALT FLD-CCNC: 17 U/L — SIGNIFICANT CHANGE UP (ref 10–45)
ANION GAP SERPL CALC-SCNC: 13 MMOL/L — SIGNIFICANT CHANGE UP (ref 5–17)
AST SERPL-CCNC: 17 U/L — SIGNIFICANT CHANGE UP (ref 10–40)
BASOPHILS # BLD AUTO: 0 K/UL — SIGNIFICANT CHANGE UP (ref 0–0.2)
BASOPHILS NFR BLD AUTO: 0 % — SIGNIFICANT CHANGE UP (ref 0–2)
BILIRUB SERPL-MCNC: 0.3 MG/DL — SIGNIFICANT CHANGE UP (ref 0.2–1.2)
BUN SERPL-MCNC: 24 MG/DL — HIGH (ref 7–23)
CALCIUM SERPL-MCNC: 9.3 MG/DL — SIGNIFICANT CHANGE UP (ref 8.4–10.5)
CHLORIDE SERPL-SCNC: 103 MMOL/L — SIGNIFICANT CHANGE UP (ref 96–108)
CO2 SERPL-SCNC: 26 MMOL/L — SIGNIFICANT CHANGE UP (ref 22–31)
CREAT SERPL-MCNC: 0.92 MG/DL — SIGNIFICANT CHANGE UP (ref 0.5–1.3)
EGFR: 65 ML/MIN/1.73M2 — SIGNIFICANT CHANGE UP
EOSINOPHIL # BLD AUTO: 0 K/UL — SIGNIFICANT CHANGE UP (ref 0–0.5)
EOSINOPHIL NFR BLD AUTO: 0 % — SIGNIFICANT CHANGE UP (ref 0–6)
GLUCOSE SERPL-MCNC: 90 MG/DL — SIGNIFICANT CHANGE UP (ref 70–99)
HCT VFR BLD CALC: 33.5 % — LOW (ref 34.5–45)
HGB BLD-MCNC: 10.9 G/DL — LOW (ref 11.5–15.5)
LYMPHOCYTES # BLD AUTO: 0.86 K/UL — LOW (ref 1–3.3)
LYMPHOCYTES # BLD AUTO: 6 % — LOW (ref 13–44)
MCHC RBC-ENTMCNC: 29.6 PG — SIGNIFICANT CHANGE UP (ref 27–34)
MCHC RBC-ENTMCNC: 32.5 G/DL — SIGNIFICANT CHANGE UP (ref 32–36)
MCV RBC AUTO: 91 FL — SIGNIFICANT CHANGE UP (ref 80–100)
METAMYELOCYTES # FLD: 2 % — HIGH (ref 0–0)
MONOCYTES # BLD AUTO: 0.14 K/UL — SIGNIFICANT CHANGE UP (ref 0–0.9)
MONOCYTES NFR BLD AUTO: 1 % — LOW (ref 2–14)
MYELOCYTES NFR BLD: 3 % — HIGH (ref 0–0)
NEUTROPHILS # BLD AUTO: 11.92 K/UL — HIGH (ref 1.8–7.4)
NEUTROPHILS NFR BLD AUTO: 83 % — HIGH (ref 43–77)
NRBC # BLD: 0 /100 — SIGNIFICANT CHANGE UP (ref 0–0)
NRBC # BLD: SIGNIFICANT CHANGE UP /100 WBCS (ref 0–0)
PLAT MORPH BLD: NORMAL — SIGNIFICANT CHANGE UP
PLATELET # BLD AUTO: 175 K/UL — SIGNIFICANT CHANGE UP (ref 150–400)
POTASSIUM SERPL-MCNC: 5 MMOL/L — SIGNIFICANT CHANGE UP (ref 3.5–5.3)
POTASSIUM SERPL-SCNC: 5 MMOL/L — SIGNIFICANT CHANGE UP (ref 3.5–5.3)
PROMYELOCYTES # FLD: 1 % — HIGH (ref 0–0)
PROT SERPL-MCNC: 6.6 G/DL — SIGNIFICANT CHANGE UP (ref 6–8.3)
RBC # BLD: 3.68 M/UL — LOW (ref 3.8–5.2)
RBC # FLD: 14.9 % — HIGH (ref 10.3–14.5)
RBC BLD AUTO: SIGNIFICANT CHANGE UP
SODIUM SERPL-SCNC: 142 MMOL/L — SIGNIFICANT CHANGE UP (ref 135–145)
VARIANT LYMPHS # BLD: 4 % — SIGNIFICANT CHANGE UP (ref 0–6)
WBC # BLD: 14.36 K/UL — HIGH (ref 3.8–10.5)
WBC # FLD AUTO: 14.36 K/UL — HIGH (ref 3.8–10.5)

## 2022-04-27 PROCEDURE — 99215 OFFICE O/P EST HI 40 MIN: CPT

## 2022-04-27 NOTE — PHYSICAL EXAM
[Fully active, able to carry on all pre-disease performance without restriction] : Status 0 - Fully active, able to carry on all pre-disease performance without restriction [Normal] : affect appropriate [de-identified] : left healed axillary and lumpectomy scar

## 2022-04-27 NOTE — HISTORY OF PRESENT ILLNESS
[T: ___] : T[unfilled] [N: ___] : N[unfilled] [M: ___] : M[unfilled] [AJCC Stage: ____] : AJCC Stage: [unfilled] [de-identified] : Ms.Ruth Crowe  is a 76 year old female here for an evaluation of breast cancer. Her oncologic history is as follows:\par \par She has a prior history of a benign excisional biopsy of the left breast in 2013, right breast in 1999 and an additional excisional biopsy with another surgeon at an unspecified time. Pathology demonstrated atypia but she has never been diagnosed with cancer. \par \par She underwent routine breast imaging on 5/21/2021(BIRADS 0) which showed  small asymmetry was seen in the central left breast for which additional views are recommended. There are questionable small nodules at the 3:00, 5:00 and 11:00 positions of the left breast on ultrasound for which a targeted ultrasound was recommended  \par Additional left breast mammo/sono on 5/31/21( BIRADS 3) revealed persistent small nodular asymmetry in the central posterior likely medial left breast by mammography, likely corresponding to a probable cyst at 10:00 on sonography. Follow-up left diagnostic mammography and targeted left breast sonography in 6 months are recommended to confirm stability of these findings.There is an additional small nodule in the upper left breast on mammo, with no sonogram correlate. for which 6 months follow-up left diagnostic mammography is recommended. There are probably benign findings on US at the 5:00 and 11:00 left breast for which follow-up targeted left breast sonography in 6 months is recommended.\par Left-sided mammogram and sonogram was performed in 11/17/2021( BI-RADS 4B) There is a 3 mm cyst vs.hypoechoic nodule in the left breast at 9:00, 6 cmfn, corresponding to a stable circumscribed nodule on mammogram. This appears somewhat irregular on sonography and ultrasound-guided biopsy is recommended.\par \par She underwent  left breast, 9 o 'clock, ultrasound guided core biopsy core biopsy on 11/19/2021 which showed Invasive poorly differentiated ductal carcinoma with prominent\par lymphocytic infiltrate,Adrian score 8/9, measuring 0.3 cm, ER:Negative, 0%, PgR:Negative, 0%, HER-2:Negative. No Ductal carcinoma in situ or microcalcifications present\par No lymphovascular permeation seen.\par She underwent a breast MRI on  12/02/2021(BI-RADS 4A) which showed recently diagnosed left breast malignancy, a non-mass enhancement in the left outer retroareolar breast, possibly corresponding to a finding seen on ultrasound at 3:00 retroareolar for which targeted left breast ultrasound and ultrasound-guided core needle biopsy is recommended.In addition, in view of the multiple bilateral enhancing foci and the presence of multiple masses on previous ultrasound studies, bilateral ultrasound is recommended at this time, to determine if any other masses in either breast demonstrate indeterminate or suspicious morphology warranting biopsy.\par \par She had BL US on  12/09/2021(BIRADS 6) which showed a left breast 9:00 6 cm from the nipple 0.3 cm irregular shaped hypoechoic mass at site of biopsy-proven invasive cancer. There is a left breast 3:00 retroareolar bilobed hypoechoic nodule/complicated cyst which correlates with the MRI finding for which ultrasound-guided core biopsy is rec.\par There is a right breast 12:00 1 cm from the nipple 0.3 cm round irregular shaped hypoechoic mass with indistinct margins. for which for ultrasound-guided core biopsy is rec.\par \par  She underwent bilateral breasts us core biopsy on 12/9/2021 the right breast 12 o'clock 1 cmfn biopsy revealed proliferative fibrocystic changes with focal moderate to florid usual ductal epithelial hyperplasia,duct ectasia showing two ducts with attenuated epithelial lining,thin fibrotic walls and surrounding mild chronic inflammation The left breast, retroareolar biopsy at 3:00 revealed proliferative fibrocystic changes with moderate to florid usual ductal epithelial hyperplasia, cyst formation and apocrine metaplasia.\par \par She underwent left breast lumpectomy on  1/13/2022 which revealed invasive poorly differentiated ductal carcinoma with prominent chronic inflammatory infiltrate,  [place] grade 3, measuring 0.5 cm, Margin were negative. Small focus of Lymphovascular invasion was noted. 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 She is Ashkenazi Mandaen.\par \par I discussed natural history and treatment options for early stage triple negative breast cancer.  Adjuvant chemotherapy is typically recommended for tumor size 6 mm or more. Triple negative breast cancer is aggressive and has a high risk for recurrence therefore chemotherapy can be considered for smaller T1 a tumors as well. She has LVI and therefore chemotherapy can be considered. Patient has very good organ function and performance status despite her age and is very highly motivated to get aggressive treatments. Patient wants to go ahead with the best possible chemotherapy option. I emphasized that Adriamycin based chemotherapy will be an overkill for T1 a tumor as well as will not be tolerable at her age therefore is not recommended. Patient said "age is just a number" and she is not ready to go anytime soon. She is very motivated to start chemotherapy ASAP. Patient reports that she came with an understanding that chemotherapy is on the table. \par \par Patient is very motivated and wants to get aggressive treatment upfront. She is willing to take chemotherapy with a good understanding of risks and benefits. We discussed that dose dense CMF will be appropriate adjuvant chemotherapy regimen for her with tolerable toxicity. Chemotherapy dose and schedule can be modified based on her tolerance as well.\par \par We discussed the data for adjuvant CMF. Adjuvant CMF is equivalent to AC x4 in terms of disease-free survival and overall survival based on randomized clinical trial data, but there is no direct comparison of ddACT or TC with CMF. Dose dense CMF was studied in a small feasibility study and we will follow the same protocol. (Kemi et al, -Clin Breast Cancer. 2010 Dec 1; 10(6): 738145)  <https://www.ncbi.nlm.nih.gov/entrez/eutils/elink.fcgi?dbfrom=pubmed&retmode=ref&cmd=prlinks&ih=95002348>.\par \par We discussed expected and unexpected side effects of chemotherapy, including but not limited to hair loss (minimal), fatigue, change in taste, mouth sores, nausea, vomiting, diarrhea, infusion reaction, allergic reaction, significant myelosuppression, need for blood transfusion, infection, bleeding, neuropathy, chemical cystitis, kidney injury, nerve pain, muscle pain and detrimental effect on liver function. Signed an informed consent after complete understanding of the risks, benefits and alternatives. Patient reports she has fatty liver. LFTs today are normal. We will keep an eye on her LFTs during chemotherapy.\par \par She asked other appropriate questions including the role of PARP inhibitors and immunotherapy. We discussed that she is not a candidate for Parp inhibitors in the adjuvant setting given the small disease. BRCA gene testing is pending. We reviewed the role of immunotherapy in the neoadjuvant setting followed by adjuvant setting for large node positive triple negative tumors. She also inquired the role of platinum therapy. We discussed toxicity associated with it and again no benefit in the adjuvant setting. We also discussed role of androgen receptor in triple negative breast cancer patients. We will ask pathology to check androgen receptor although the therapy is not indicated in the adjuvant setting.\par \par She is a candidate for radiation therapy. \par \par \par PET CT 02/04/2022 Mildly FDG avid amorphous soft tissue in the medial aspect of the left breast and mildly FDG avid soft tissue and low-attenuation density in the left axilla likely represents post surgical changes. Please correlate clinically and with follow-up study. No evidence of metastatic disease.\par 2. Nonspecific minimally FDG avid nodular soft tissue within the right breast, likely representing post biopsy changes. Correlate clinically\par 3. Nonspecific hypermetabolism in the right colon with no definite abnormality on CT. Please correlate clinically or with colonoscopy as warranted.\par Patient recently had colonoscopy she will f/u with GI prn\par EKG done , Chemo consent obtained \par Patient is a anxious about stating chemo today\par Emotional support given [de-identified] : Ms. 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 She is s/p lumpectomy on 1/13/2022. Tumor size is 5 mm, 2 sentinel lymph nodes are negative but focal lymphovascular invasion was noted. ddCMF started 2/16/22. PET 2/2022 PARVEZ\par \par Chemo # 6 CMF onpro. Tolerating well, occasional lightheadedness D7 most likely 2/2 dehydration. She took BP at home. \par She reports mild-moderate fatigue and altered taste x 3-4 days after chemo. She was able to function, maintained PO intake, weight stable. She had mild nausea, took Reglan, no vomiting, no diarrhea or mouth sores. No Bone pain, no neuropathy, no fevers\par She has mild headache, resolved w/o meds. Mild hair thinning  [5 - Distress Level] : Distress Level: 5 [Patient given social work contact information and resource sheet] : Patient was given social work contact information and resource sheet

## 2022-04-27 NOTE — ASSESSMENT
[FreeTextEntry1] : Ms. BEN ANAND is a 76  year old postmenopausal female with stage 1A ( pT1a,N0,M0) ER/[place]/HER-2/gaudencio negative invasive poorly differentiated ductal carcinoma of the left breast. She is s/p lumpectomy on 1/13/2022. Tumor size is 5 mm, 2 sentinel lymph nodes are negative but focal lymphovascular invasion was noted. Adj CMF started 2/16/2022. PET PARVEZ 2/2022\par \par - Breast ca: On ddCMF chemo, C #6/8, on 4/27//22 Tolerating well She reports mild lightheadedness with position change on C4 D7  most likely 2/2 dehydration B/P 137/75 encouraged fluid intake can consider IV fluids if persists or worsens.\par Patient is on cytotoxic chemotherapy and needs intensive monitoring for severe toxicity.\par CBC reviewed with the patient today to make sure she is not neutropenic from high risk cancer therapy drug.  We will continue to monitor CBC every 2 to 3 weeks for intense drug monitoring. Counts good, will proceed with tx\par - RT with Dr Beach after chemo.\par - Chemo induced anemia-  No transfusion needed. Monitor counts\par - Mild leukocytosis secondary to ONPRO. Check cbc each cycle\par - Chemo induced diarrhea-  Imodium PRN. Maintain PO hydration. BRAT diet\par - Chemo induced N/V- Will get premedications with Emend, dexamethasone and Aloxi. She will continue dexamethasone for next 3 days for antinausea prophylaxis. She will use Reglan as needed. \par - GF induced bone pain- She will take Claritin\par - Mucositis- mild mouth sores , continue biotene  if worsens will start first mouthwash . \par - Transaminitis/ Elevated ALK phos- grade 1 elevation will monitor\par - Chemo induced dysgeusia and fatigue: Encourage p.o. fluids, small frequent meals.\par - Instructed to call office and go directly to the emergency room with fever more than 100.4, shaking chills, productive cough, sore throat, shortness of breath or urinary symptoms. Patient verbalized understanding and agreement.\par - Emotional support provided, all questions answered.\par RTO 2 weeks\par CHEMO ordered in [facility]\par  [Curative] : Goals of care discussed with patient: Curative

## 2022-05-02 ENCOUNTER — NON-APPOINTMENT (OUTPATIENT)
Age: 77
End: 2022-05-02

## 2022-05-06 ENCOUNTER — OUTPATIENT (OUTPATIENT)
Dept: OUTPATIENT SERVICES | Facility: HOSPITAL | Age: 77
LOS: 1 days | Discharge: HOME | End: 2022-05-06

## 2022-05-06 DIAGNOSIS — Z98.890 OTHER SPECIFIED POSTPROCEDURAL STATES: Chronic | ICD-10-CM

## 2022-05-06 DIAGNOSIS — D64.9 ANEMIA, UNSPECIFIED: ICD-10-CM

## 2022-05-06 DIAGNOSIS — Z90.722 ACQUIRED ABSENCE OF OVARIES, BILATERAL: Chronic | ICD-10-CM

## 2022-05-11 ENCOUNTER — RESULT REVIEW (OUTPATIENT)
Age: 77
End: 2022-05-11

## 2022-05-11 ENCOUNTER — APPOINTMENT (OUTPATIENT)
Dept: HEMATOLOGY ONCOLOGY | Facility: CLINIC | Age: 77
End: 2022-05-11
Payer: MEDICARE

## 2022-05-11 ENCOUNTER — APPOINTMENT (OUTPATIENT)
Dept: INFUSION THERAPY | Facility: HOSPITAL | Age: 77
End: 2022-05-11

## 2022-05-11 VITALS
WEIGHT: 193.76 LBS | BODY MASS INDEX: 35.21 KG/M2 | HEART RATE: 75 BPM | OXYGEN SATURATION: 97 % | RESPIRATION RATE: 16 BRPM | DIASTOLIC BLOOD PRESSURE: 78 MMHG | HEIGHT: 62.17 IN | TEMPERATURE: 97.3 F | SYSTOLIC BLOOD PRESSURE: 122 MMHG

## 2022-05-11 DIAGNOSIS — R11.2 NAUSEA WITH VOMITING, UNSPECIFIED: ICD-10-CM

## 2022-05-11 DIAGNOSIS — C50.919 MALIGNANT NEOPLASM OF UNSPECIFIED SITE OF UNSPECIFIED FEMALE BREAST: ICD-10-CM

## 2022-05-11 DIAGNOSIS — Z51.11 ENCOUNTER FOR ANTINEOPLASTIC CHEMOTHERAPY: ICD-10-CM

## 2022-05-11 DIAGNOSIS — Z51.89 ENCOUNTER FOR OTHER SPECIFIED AFTERCARE: ICD-10-CM

## 2022-05-11 LAB
ALBUMIN SERPL ELPH-MCNC: 4.4 G/DL — SIGNIFICANT CHANGE UP (ref 3.3–5)
ALP SERPL-CCNC: 129 U/L — HIGH (ref 40–120)
ALT FLD-CCNC: 23 U/L — SIGNIFICANT CHANGE UP (ref 10–45)
ANION GAP SERPL CALC-SCNC: 9 MMOL/L — SIGNIFICANT CHANGE UP (ref 5–17)
AST SERPL-CCNC: 21 U/L — SIGNIFICANT CHANGE UP (ref 10–40)
BASOPHILS # BLD AUTO: 0 K/UL — SIGNIFICANT CHANGE UP (ref 0–0.2)
BASOPHILS NFR BLD AUTO: 0 % — SIGNIFICANT CHANGE UP (ref 0–2)
BILIRUB SERPL-MCNC: 0.2 MG/DL — SIGNIFICANT CHANGE UP (ref 0.2–1.2)
BUN SERPL-MCNC: 16 MG/DL — SIGNIFICANT CHANGE UP (ref 7–23)
CALCIUM SERPL-MCNC: 9 MG/DL — SIGNIFICANT CHANGE UP (ref 8.4–10.5)
CHLORIDE SERPL-SCNC: 103 MMOL/L — SIGNIFICANT CHANGE UP (ref 96–108)
CO2 SERPL-SCNC: 29 MMOL/L — SIGNIFICANT CHANGE UP (ref 22–31)
CREAT SERPL-MCNC: 0.98 MG/DL — SIGNIFICANT CHANGE UP (ref 0.5–1.3)
EGFR: 60 ML/MIN/1.73M2 — SIGNIFICANT CHANGE UP
EOSINOPHIL # BLD AUTO: 0.36 K/UL — SIGNIFICANT CHANGE UP (ref 0–0.5)
EOSINOPHIL NFR BLD AUTO: 2 % — SIGNIFICANT CHANGE UP (ref 0–6)
GLUCOSE SERPL-MCNC: 98 MG/DL — SIGNIFICANT CHANGE UP (ref 70–99)
HCT VFR BLD CALC: 33 % — LOW (ref 34.5–45)
HGB BLD-MCNC: 10.9 G/DL — LOW (ref 11.5–15.5)
LYMPHOCYTES # BLD AUTO: 0.36 K/UL — LOW (ref 1–3.3)
LYMPHOCYTES # BLD AUTO: 2 % — LOW (ref 13–44)
MCHC RBC-ENTMCNC: 29.9 PG — SIGNIFICANT CHANGE UP (ref 27–34)
MCHC RBC-ENTMCNC: 33 G/DL — SIGNIFICANT CHANGE UP (ref 32–36)
MCV RBC AUTO: 90.7 FL — SIGNIFICANT CHANGE UP (ref 80–100)
METAMYELOCYTES # FLD: 2 % — HIGH (ref 0–0)
MONOCYTES # BLD AUTO: 1.43 K/UL — HIGH (ref 0–0.9)
MONOCYTES NFR BLD AUTO: 8 % — SIGNIFICANT CHANGE UP (ref 2–14)
MYELOCYTES NFR BLD: 1 % — HIGH (ref 0–0)
NEUTROPHILS # BLD AUTO: 15 K/UL — HIGH (ref 1.8–7.4)
NEUTROPHILS NFR BLD AUTO: 84 % — HIGH (ref 43–77)
NRBC # BLD: 0 /100 — SIGNIFICANT CHANGE UP (ref 0–0)
NRBC # BLD: SIGNIFICANT CHANGE UP /100 WBCS (ref 0–0)
PLAT MORPH BLD: NORMAL — SIGNIFICANT CHANGE UP
PLATELET # BLD AUTO: 152 K/UL — SIGNIFICANT CHANGE UP (ref 150–400)
POTASSIUM SERPL-MCNC: 4.7 MMOL/L — SIGNIFICANT CHANGE UP (ref 3.5–5.3)
POTASSIUM SERPL-SCNC: 4.7 MMOL/L — SIGNIFICANT CHANGE UP (ref 3.5–5.3)
PROMYELOCYTES # FLD: 1 % — HIGH (ref 0–0)
PROT SERPL-MCNC: 6.7 G/DL — SIGNIFICANT CHANGE UP (ref 6–8.3)
RBC # BLD: 3.64 M/UL — LOW (ref 3.8–5.2)
RBC # FLD: 15.3 % — HIGH (ref 10.3–14.5)
RBC BLD AUTO: SIGNIFICANT CHANGE UP
SODIUM SERPL-SCNC: 141 MMOL/L — SIGNIFICANT CHANGE UP (ref 135–145)
WBC # BLD: 17.86 K/UL — HIGH (ref 3.8–10.5)
WBC # FLD AUTO: 17.86 K/UL — HIGH (ref 3.8–10.5)

## 2022-05-11 PROCEDURE — 99215 OFFICE O/P EST HI 40 MIN: CPT

## 2022-05-13 NOTE — PHYSICAL EXAM
[Fully active, able to carry on all pre-disease performance without restriction] : Status 0 - Fully active, able to carry on all pre-disease performance without restriction [Normal] : clear to auscultation bilaterally, no dullness, no wheezing [de-identified] : left healed axillary and lumpectomy scar

## 2022-05-13 NOTE — HISTORY OF PRESENT ILLNESS
[T: ___] : T[unfilled] [N: ___] : N[unfilled] [M: ___] : M[unfilled] [AJCC Stage: ____] : AJCC Stage: [unfilled] [5 - Distress Level] : Distress Level: 5 [Patient given social work contact information and resource sheet] : Patient was given social work contact information and resource sheet [de-identified] : Ms.Ruth Crowe  is a 76 year old female here for an evaluation of breast cancer. Her oncologic history is as follows:\par \par She has a prior history of a benign excisional biopsy of the left breast in 2013, right breast in 1999 and an additional excisional biopsy with another surgeon at an unspecified time. Pathology demonstrated atypia but she has never been diagnosed with cancer. \par \par She underwent routine breast imaging on 5/21/2021(BIRADS 0) which showed  small asymmetry was seen in the central left breast for which additional views are recommended. There are questionable small nodules at the 3:00, 5:00 and 11:00 positions of the left breast on ultrasound for which a targeted ultrasound was recommended  \par Additional left breast mammo/sono on 5/31/21( BIRADS 3) revealed persistent small nodular asymmetry in the central posterior likely medial left breast by mammography, likely corresponding to a probable cyst at 10:00 on sonography. Follow-up left diagnostic mammography and targeted left breast sonography in 6 months are recommended to confirm stability of these findings.There is an additional small nodule in the upper left breast on mammo, with no sonogram correlate. for which 6 months follow-up left diagnostic mammography is recommended. There are probably benign findings on US at the 5:00 and 11:00 left breast for which follow-up targeted left breast sonography in 6 months is recommended.\par Left-sided mammogram and sonogram was performed in 11/17/2021( BI-RADS 4B) There is a 3 mm cyst vs.hypoechoic nodule in the left breast at 9:00, 6 cmfn, corresponding to a stable circumscribed nodule on mammogram. This appears somewhat irregular on sonography and ultrasound-guided biopsy is recommended.\par \par She underwent  left breast, 9 o 'clock, ultrasound guided core biopsy core biopsy on 11/19/2021 which showed Invasive poorly differentiated ductal carcinoma with prominent\par lymphocytic infiltrate,Adrian score 8/9, measuring 0.3 cm, ER:Negative, 0%, PgR:Negative, 0%, HER-2:Negative. No Ductal carcinoma in situ or microcalcifications present\par No lymphovascular permeation seen.\par She underwent a breast MRI on  12/02/2021(BI-RADS 4A) which showed recently diagnosed left breast malignancy, a non-mass enhancement in the left outer retroareolar breast, possibly corresponding to a finding seen on ultrasound at 3:00 retroareolar for which targeted left breast ultrasound and ultrasound-guided core needle biopsy is recommended.In addition, in view of the multiple bilateral enhancing foci and the presence of multiple masses on previous ultrasound studies, bilateral ultrasound is recommended at this time, to determine if any other masses in either breast demonstrate indeterminate or suspicious morphology warranting biopsy.\par \par She had BL US on  12/09/2021(BIRADS 6) which showed a left breast 9:00 6 cm from the nipple 0.3 cm irregular shaped hypoechoic mass at site of biopsy-proven invasive cancer. There is a left breast 3:00 retroareolar bilobed hypoechoic nodule/complicated cyst which correlates with the MRI finding for which ultrasound-guided core biopsy is rec.\par There is a right breast 12:00 1 cm from the nipple 0.3 cm round irregular shaped hypoechoic mass with indistinct margins. for which for ultrasound-guided core biopsy is rec.\par \par  She underwent bilateral breasts us core biopsy on 12/9/2021 the right breast 12 o'clock 1 cmfn biopsy revealed proliferative fibrocystic changes with focal moderate to florid usual ductal epithelial hyperplasia,duct ectasia showing two ducts with attenuated epithelial lining,thin fibrotic walls and surrounding mild chronic inflammation The left breast, retroareolar biopsy at 3:00 revealed proliferative fibrocystic changes with moderate to florid usual ductal epithelial hyperplasia, cyst formation and apocrine metaplasia.\par \par She underwent left breast lumpectomy on  1/13/2022 which revealed invasive poorly differentiated ductal carcinoma with prominent chronic inflammatory infiltrate,  [place] grade 3, measuring 0.5 cm, Margin were negative. Small focus of Lymphovascular invasion was noted. 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 She is Ashkenazi Mos[employer].\par \par I discussed natural history and treatment options for early stage triple negative breast cancer.  Adjuvant chemotherapy is typically recommended for tumor size 6 mm or more. Triple negative breast cancer is aggressive and has a high risk for recurrence therefore chemotherapy can be considered for smaller T1 a tumors as well. She has LVI and therefore chemotherapy can be considered. Patient has very good organ function and performance status despite her age and is very highly motivated to get aggressive treatments. Patient wants to go ahead with the best possible chemotherapy option. I emphasized that Adriamycin based chemotherapy will be an overkill for T1 a tumor as well as will not be tolerable at her age therefore is not recommended. Patient said "age is just a number" and she is not ready to go anytime soon. She is very motivated to start chemotherapy ASAP. Patient reports that she came with an understanding that chemotherapy is on the table. \par \par Patient is very motivated and wants to get aggressive treatment upfront. She is willing to take chemotherapy with a good understanding of risks and benefits. We discussed that dose dense CMF will be appropriate adjuvant chemotherapy regimen for her with tolerable toxicity. Chemotherapy dose and schedule can be modified based on her tolerance as well.\par \par We discussed the data for adjuvant CMF. Adjuvant CMF is equivalent to AC x4 in terms of disease-free survival and overall survival based on randomized clinical trial data, but there is no direct comparison of ddACT or TC with CMF. Dose dense CMF was studied in a small feasibility study and we will follow the same protocol. (Kemi et al, -Clin Breast Cancer. 2010 Dec 1; 10(6): 949713)  <https://www.ncbi.nlm.nih.gov/entrez/eutils/elink.fcgi?dbfrom=pubmed&retmode=ref&cmd=prlinks&jj=03959418>.\par \par We discussed expected and unexpected side effects of chemotherapy, including but not limited to hair loss (minimal), fatigue, change in taste, mouth sores, nausea, vomiting, diarrhea, infusion reaction, allergic reaction, significant myelosuppression, need for blood transfusion, infection, bleeding, neuropathy, chemical cystitis, kidney injury, nerve pain, muscle pain and detrimental effect on liver function. Signed an informed consent after complete understanding of the risks, benefits and alternatives. Patient reports she has fatty liver. LFTs today are normal. We will keep an eye on her LFTs during chemotherapy.\par \par She asked other appropriate questions including the role of PARP inhibitors and immunotherapy. We discussed that she is not a candidate for Parp inhibitors in the adjuvant setting given the small disease. BRCA gene testing is pending. We reviewed the role of immunotherapy in the neoadjuvant setting followed by adjuvant setting for large node positive triple negative tumors. She also inquired the role of platinum therapy. We discussed toxicity associated with it and again no benefit in the adjuvant setting. We also discussed role of androgen receptor in triple negative breast cancer patients. We will ask pathology to check androgen receptor although the therapy is not indicated in the adjuvant setting.\par \par She is a candidate for radiation therapy. \par \par \par PET CT 02/04/2022 Mildly FDG avid amorphous soft tissue in the medial aspect of the left breast and mildly FDG avid soft tissue and low-attenuation density in the left axilla likely represents post surgical changes. Please correlate clinically and with follow-up study. No evidence of metastatic disease.\par 2. Nonspecific minimally FDG avid nodular soft tissue within the right breast, likely representing post biopsy changes. Correlate clinically\par 3. Nonspecific hypermetabolism in the right colon with no definite abnormality on CT. Please correlate clinically or with colonoscopy as warranted.\par Patient recently had colonoscopy she will f/u with GI prn\par EKG done , Chemo consent obtained \par Patient is a anxious about stating chemo today\par Emotional support given [de-identified] : Ms. 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 She is s/p lumpectomy on 1/13/2022. Tumor size is 5 mm, 2 sentinel lymph nodes are negative but focal lymphovascular invasion was noted. ddCMF started 2/16/22. PET 2/2022 PARVEZ\par \par Chemo # 7 CMF onpro today 5/11/22. Tolerating well, occasional lightheadedness D7 last tx most likely 2/2 dehydration. She took BP at home stable. \par She reports mild-moderate fatigue and altered taste x 3-4 days after chemo. She was able to function, maintained PO intake, weight stable. She had mild nausea, took Reglan, no vomiting, no diarrhea or mouth sores. No Bone pain, no neuropathy, no fevers\par She has mild headache, resolved w/o meds. Mild hair thinning

## 2022-05-13 NOTE — ASSESSMENT
[Curative] : Goals of care discussed with patient: Curative [FreeTextEntry1] : Ms. 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 She is s/p lumpectomy on 1/13/2022. Tumor size is 5 mm, 2 sentinel lymph nodes are negative but focal lymphovascular invasion was noted. Adj CMF started 2/16/2022. PET PARVEZ 2/2022\par \par - Breast ca: On ddCMF chemo, C #7/8, on 5/11/22 Tolerating well She reports mild lightheadedness with position change on C4 D7  most likely 2/2 dehydration. monitors B/P at home stable. encouraged fluid intake will consider IV fluids if persists or worsens.\par Patient is on cytotoxic chemotherapy and needs intensive monitoring for severe toxicity.\par CBC reviewed with the patient today to make sure she is not neutropenic from high risk cancer therapy drug.  We will continue to monitor CBC every 2 to 3 weeks for intense drug monitoring. Counts good, will proceed with tx\par - RT with Dr Beach after chemo e-mail sent to rad onc..\par - Chemo induced anemia-  grade 1 stable Hgb 10.9 No transfusion needed. Monitor counts\par - Mild leukocytosis secondary to ONPRO. Check cbc each cycle\par - Chemo induced diarrhea-  Imodium PRN. Maintain PO hydration. BRAT diet\par - Chemo induced N/V- Will get premedications with Emend, dexamethasone and Aloxi. She will continue dexamethasone for next 3 days for antinausea prophylaxis. She will use Reglan as needed. \par - GF induced bone pain- She will take Claritin\par - Mucositis- mild mouth sores , continue biotene  if worsens will start first mouthwash . \par - Transaminitis/ Elevated ALK phos- grade 1 elevation will monitor\par - Chemo induced dysgeusia and fatigue: Encourage p.o. fluids, small frequent meals.\par - Instructed to call office and go directly to the emergency room with fever more than 100.4, shaking chills, productive cough, sore throat, shortness of breath or urinary symptoms. Patient verbalized understanding and agreement.\par - Emotional support provided, all questions answered.\par \par RTO 2 weeks\par CHEMO ordered in [facility]\par

## 2022-05-15 ENCOUNTER — OUTPATIENT (OUTPATIENT)
Dept: OUTPATIENT SERVICES | Facility: HOSPITAL | Age: 77
LOS: 1 days | Discharge: HOME | End: 2022-05-15
Payer: MEDICARE

## 2022-05-15 DIAGNOSIS — Z90.722 ACQUIRED ABSENCE OF OVARIES, BILATERAL: Chronic | ICD-10-CM

## 2022-05-15 DIAGNOSIS — Z98.890 OTHER SPECIFIED POSTPROCEDURAL STATES: Chronic | ICD-10-CM

## 2022-05-16 PROCEDURE — 77263 THER RADIOLOGY TX PLNG CPLX: CPT

## 2022-05-25 ENCOUNTER — RESULT REVIEW (OUTPATIENT)
Age: 77
End: 2022-05-25

## 2022-05-25 ENCOUNTER — APPOINTMENT (OUTPATIENT)
Dept: HEMATOLOGY ONCOLOGY | Facility: CLINIC | Age: 77
End: 2022-05-25
Payer: MEDICARE

## 2022-05-25 ENCOUNTER — APPOINTMENT (OUTPATIENT)
Dept: INFUSION THERAPY | Facility: HOSPITAL | Age: 77
End: 2022-05-25

## 2022-05-25 VITALS
TEMPERATURE: 97.5 F | BODY MASS INDEX: 35.45 KG/M2 | WEIGHT: 195.11 LBS | HEART RATE: 81 BPM | RESPIRATION RATE: 16 BRPM | OXYGEN SATURATION: 98 % | SYSTOLIC BLOOD PRESSURE: 125 MMHG | DIASTOLIC BLOOD PRESSURE: 81 MMHG | HEIGHT: 62.17 IN

## 2022-05-25 DIAGNOSIS — R53.83 OTHER FATIGUE: ICD-10-CM

## 2022-05-25 DIAGNOSIS — Z51.11 ENCOUNTER FOR ANTINEOPLASTIC CHEMOTHERAPY: ICD-10-CM

## 2022-05-25 DIAGNOSIS — D64.81 ANEMIA DUE TO ANTINEOPLASTIC CHEMOTHERAPY: ICD-10-CM

## 2022-05-25 DIAGNOSIS — T45.1X5A ANEMIA DUE TO ANTINEOPLASTIC CHEMOTHERAPY: ICD-10-CM

## 2022-05-25 DIAGNOSIS — T45.1X5A NAUSEA: ICD-10-CM

## 2022-05-25 DIAGNOSIS — R11.0 NAUSEA: ICD-10-CM

## 2022-05-25 DIAGNOSIS — R43.2 PARAGEUSIA: ICD-10-CM

## 2022-05-25 LAB
ALBUMIN SERPL ELPH-MCNC: 4.3 G/DL — SIGNIFICANT CHANGE UP (ref 3.3–5)
ALP SERPL-CCNC: 115 U/L — SIGNIFICANT CHANGE UP (ref 40–120)
ALT FLD-CCNC: 22 U/L — SIGNIFICANT CHANGE UP (ref 10–45)
ANION GAP SERPL CALC-SCNC: 14 MMOL/L — SIGNIFICANT CHANGE UP (ref 5–17)
AST SERPL-CCNC: 22 U/L — SIGNIFICANT CHANGE UP (ref 10–40)
BASOPHILS # BLD AUTO: 0 K/UL — SIGNIFICANT CHANGE UP (ref 0–0.2)
BASOPHILS NFR BLD AUTO: 0 % — SIGNIFICANT CHANGE UP (ref 0–2)
BILIRUB SERPL-MCNC: 0.2 MG/DL — SIGNIFICANT CHANGE UP (ref 0.2–1.2)
BUN SERPL-MCNC: 16 MG/DL — SIGNIFICANT CHANGE UP (ref 7–23)
CALCIUM SERPL-MCNC: 8.9 MG/DL — SIGNIFICANT CHANGE UP (ref 8.4–10.5)
CHLORIDE SERPL-SCNC: 102 MMOL/L — SIGNIFICANT CHANGE UP (ref 96–108)
CHOLEST SERPL-MCNC: 134 MG/DL — SIGNIFICANT CHANGE UP
CO2 SERPL-SCNC: 25 MMOL/L — SIGNIFICANT CHANGE UP (ref 22–31)
CREAT SERPL-MCNC: 0.91 MG/DL — SIGNIFICANT CHANGE UP (ref 0.5–1.3)
EGFR: 65 ML/MIN/1.73M2 — SIGNIFICANT CHANGE UP
EOSINOPHIL # BLD AUTO: 0.42 K/UL — SIGNIFICANT CHANGE UP (ref 0–0.5)
EOSINOPHIL NFR BLD AUTO: 3 % — SIGNIFICANT CHANGE UP (ref 0–6)
GLUCOSE SERPL-MCNC: 109 MG/DL — HIGH (ref 70–99)
HCT VFR BLD CALC: 32.2 % — LOW (ref 34.5–45)
HDLC SERPL-MCNC: 51 MG/DL — SIGNIFICANT CHANGE UP
HGB BLD-MCNC: 10.7 G/DL — LOW (ref 11.5–15.5)
LIPID PNL WITH DIRECT LDL SERPL: 47 MG/DL — SIGNIFICANT CHANGE UP
LYMPHOCYTES # BLD AUTO: 0.57 K/UL — LOW (ref 1–3.3)
LYMPHOCYTES # BLD AUTO: 4 % — LOW (ref 13–44)
MCHC RBC-ENTMCNC: 30.5 PG — SIGNIFICANT CHANGE UP (ref 27–34)
MCHC RBC-ENTMCNC: 33.2 G/DL — SIGNIFICANT CHANGE UP (ref 32–36)
MCV RBC AUTO: 91.7 FL — SIGNIFICANT CHANGE UP (ref 80–100)
MONOCYTES # BLD AUTO: 0.57 K/UL — SIGNIFICANT CHANGE UP (ref 0–0.9)
MONOCYTES NFR BLD AUTO: 4 % — SIGNIFICANT CHANGE UP (ref 2–14)
NEUTROPHILS # BLD AUTO: 12.58 K/UL — HIGH (ref 1.8–7.4)
NEUTROPHILS NFR BLD AUTO: 89 % — HIGH (ref 43–77)
NON HDL CHOLESTEROL: 83 MG/DL — SIGNIFICANT CHANGE UP
NRBC # BLD: 0 /100 — SIGNIFICANT CHANGE UP (ref 0–0)
NRBC # BLD: SIGNIFICANT CHANGE UP /100 WBCS (ref 0–0)
PLAT MORPH BLD: NORMAL — SIGNIFICANT CHANGE UP
PLATELET # BLD AUTO: 160 K/UL — SIGNIFICANT CHANGE UP (ref 150–400)
POTASSIUM SERPL-MCNC: 4.5 MMOL/L — SIGNIFICANT CHANGE UP (ref 3.5–5.3)
POTASSIUM SERPL-SCNC: 4.5 MMOL/L — SIGNIFICANT CHANGE UP (ref 3.5–5.3)
PROT SERPL-MCNC: 6.3 G/DL — SIGNIFICANT CHANGE UP (ref 6–8.3)
RBC # BLD: 3.51 M/UL — LOW (ref 3.8–5.2)
RBC # FLD: 15.5 % — HIGH (ref 10.3–14.5)
RBC BLD AUTO: SIGNIFICANT CHANGE UP
SODIUM SERPL-SCNC: 140 MMOL/L — SIGNIFICANT CHANGE UP (ref 135–145)
T3FREE SERPL-MCNC: 2.63 PG/ML — SIGNIFICANT CHANGE UP (ref 1.8–4.6)
T4 FREE SERPL-MCNC: 1.7 NG/DL — SIGNIFICANT CHANGE UP (ref 0.9–1.8)
TRIGL SERPL-MCNC: 183 MG/DL — HIGH
TSH SERPL-MCNC: 2.29 UIU/ML — SIGNIFICANT CHANGE UP (ref 0.27–4.2)
WBC # BLD: 14.14 K/UL — HIGH (ref 3.8–10.5)
WBC # FLD AUTO: 14.14 K/UL — HIGH (ref 3.8–10.5)

## 2022-05-25 PROCEDURE — 99215 OFFICE O/P EST HI 40 MIN: CPT

## 2022-05-25 NOTE — HISTORY OF PRESENT ILLNESS
[T: ___] : T[unfilled] [N: ___] : N[unfilled] [M: ___] : M[unfilled] [AJCC Stage: ____] : AJCC Stage: [unfilled] [5 - Distress Level] : Distress Level: 5 [Patient given social work contact information and resource sheet] : Patient was given social work contact information and resource sheet [de-identified] : Ms.Ruth Crowe  is a 76 year old female here for an evaluation of breast cancer. Her oncologic history is as follows:\par \par She has a prior history of a benign excisional biopsy of the left breast in 2013, right breast in 1999 and an additional excisional biopsy with another surgeon at an unspecified time. Pathology demonstrated atypia but she has never been diagnosed with cancer. \par \par She underwent routine breast imaging on 5/21/2021(BIRADS 0) which showed  small asymmetry was seen in the central left breast for which additional views are recommended. There are questionable small nodules at the 3:00, 5:00 and 11:00 positions of the left breast on ultrasound for which a targeted ultrasound was recommended  \par Additional left breast mammo/sono on 5/31/21( BIRADS 3) revealed persistent small nodular asymmetry in the central posterior likely medial left breast by mammography, likely corresponding to a probable cyst at 10:00 on sonography. Follow-up left diagnostic mammography and targeted left breast sonography in 6 months are recommended to confirm stability of these findings.There is an additional small nodule in the upper left breast on mammo, with no sonogram correlate. for which 6 months follow-up left diagnostic mammography is recommended. There are probably benign findings on US at the 5:00 and 11:00 left breast for which follow-up targeted left breast sonography in 6 months is recommended.\par Left-sided mammogram and sonogram was performed in 11/17/2021( BI-RADS 4B) There is a 3 mm cyst vs.hypoechoic nodule in the left breast at 9:00, 6 cmfn, corresponding to a stable circumscribed nodule on mammogram. This appears somewhat irregular on sonography and ultrasound-guided biopsy is recommended.\par \par She underwent  left breast, 9 o 'clock, ultrasound guided core biopsy core biopsy on 11/19/2021 which showed Invasive poorly differentiated ductal carcinoma with prominent\par lymphocytic infiltrate,Adrian score 8/9, measuring 0.3 cm, ER:Negative, 0%, PgR:Negative, 0%, HER-2:Negative. No Ductal carcinoma in situ or microcalcifications present\par No lymphovascular permeation seen.\par She underwent a breast MRI on  12/02/2021(BI-RADS 4A) which showed recently diagnosed left breast malignancy, a non-mass enhancement in the left outer retroareolar breast, possibly corresponding to a finding seen on ultrasound at 3:00 retroareolar for which targeted left breast ultrasound and ultrasound-guided core needle biopsy is recommended.In addition, in view of the multiple bilateral enhancing foci and the presence of multiple masses on previous ultrasound studies, bilateral ultrasound is recommended at this time, to determine if any other masses in either breast demonstrate indeterminate or suspicious morphology warranting biopsy.\par \par She had BL US on  12/09/2021(BIRADS 6) which showed a left breast 9:00 6 cm from the nipple 0.3 cm irregular shaped hypoechoic mass at site of biopsy-proven invasive cancer. There is a left breast 3:00 retroareolar bilobed hypoechoic nodule/complicated cyst which correlates with the MRI finding for which ultrasound-guided core biopsy is rec.\par There is a right breast 12:00 1 cm from the nipple 0.3 cm round irregular shaped hypoechoic mass with indistinct margins. for which for ultrasound-guided core biopsy is rec.\par \par  She underwent bilateral breasts us core biopsy on 12/9/2021 the right breast 12 o'clock 1 cmfn biopsy revealed proliferative fibrocystic changes with focal moderate to florid usual ductal epithelial hyperplasia,duct ectasia showing two ducts with attenuated epithelial lining,thin fibrotic walls and surrounding mild chronic inflammation The left breast, retroareolar biopsy at 3:00 revealed proliferative fibrocystic changes with moderate to florid usual ductal epithelial hyperplasia, cyst formation and apocrine metaplasia.\par \par She underwent left breast lumpectomy on  1/13/2022 which revealed invasive poorly differentiated ductal carcinoma with prominent chronic inflammatory infiltrate,  [place] grade 3, measuring 0.5 cm, Margin were negative. Small focus of Lymphovascular invasion was noted. 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 She is Ashkenazi Sabianist.\par \par I discussed natural history and treatment options for early stage triple negative breast cancer.  Adjuvant chemotherapy is typically recommended for tumor size 6 mm or more. Triple negative breast cancer is aggressive and has a high risk for recurrence therefore chemotherapy can be considered for smaller T1 a tumors as well. She has LVI and therefore chemotherapy can be considered. Patient has very good organ function and performance status despite her age and is very highly motivated to get aggressive treatments. Patient wants to go ahead with the best possible chemotherapy option. I emphasized that Adriamycin based chemotherapy will be an overkill for T1 a tumor as well as will not be tolerable at her age therefore is not recommended. Patient said "age is just a number" and she is not ready to go anytime soon. She is very motivated to start chemotherapy ASAP. Patient reports that she came with an understanding that chemotherapy is on the table. \par \par Patient is very motivated and wants to get aggressive treatment upfront. She is willing to take chemotherapy with a good understanding of risks and benefits. We discussed that dose dense CMF will be appropriate adjuvant chemotherapy regimen for her with tolerable toxicity. Chemotherapy dose and schedule can be modified based on her tolerance as well.\par \par We discussed the data for adjuvant CMF. Adjuvant CMF is equivalent to AC x4 in terms of disease-free survival and overall survival based on randomized clinical trial data, but there is no direct comparison of ddACT or TC with CMF. Dose dense CMF was studied in a small feasibility study and we will follow the same protocol. (Kemi et al, -Clin Breast Cancer. 2010 Dec 1; 10(6): 969492)  <https://www.ncbi.nlm.nih.gov/entrez/eutils/elink.fcgi?dbfrom=pubmed&retmode=ref&cmd=prlinks&sv=20212651>.\par \par We discussed expected and unexpected side effects of chemotherapy, including but not limited to hair loss (minimal), fatigue, change in taste, mouth sores, nausea, vomiting, diarrhea, infusion reaction, allergic reaction, significant myelosuppression, need for blood transfusion, infection, bleeding, neuropathy, chemical cystitis, kidney injury, nerve pain, muscle pain and detrimental effect on liver function. Signed an informed consent after complete understanding of the risks, benefits and alternatives. Patient reports she has fatty liver. LFTs today are normal. We will keep an eye on her LFTs during chemotherapy.\par \par She asked other appropriate questions including the role of PARP inhibitors and immunotherapy. We discussed that she is not a candidate for Parp inhibitors in the adjuvant setting given the small disease. BRCA gene testing is pending. We reviewed the role of immunotherapy in the neoadjuvant setting followed by adjuvant setting for large node positive triple negative tumors. She also inquired the role of platinum therapy. We discussed toxicity associated with it and again no benefit in the adjuvant setting. We also discussed role of androgen receptor in triple negative breast cancer patients. We will ask pathology to check androgen receptor although the therapy is not indicated in the adjuvant setting.\par \par She is a candidate for radiation therapy. \par \par \par PET CT 02/04/2022 Mildly FDG avid amorphous soft tissue in the medial aspect of the left breast and mildly FDG avid soft tissue and low-attenuation density in the left axilla likely represents post surgical changes. Please correlate clinically and with follow-up study. No evidence of metastatic disease.\par 2. Nonspecific minimally FDG avid nodular soft tissue within the right breast, likely representing post biopsy changes. Correlate clinically\par 3. Nonspecific hypermetabolism in the right colon with no definite abnormality on CT. Please correlate clinically or with colonoscopy as warranted.\par Patient recently had colonoscopy she will f/u with GI prn\par EKG done , Chemo consent obtained \par Patient is a anxious about stating chemo today\par Emotional support given [de-identified] : Ms. 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 She is s/p lumpectomy on 1/13/2022. Tumor size is 5 mm, 2 sentinel lymph nodes are negative but focal lymphovascular invasion was noted. ddCMF started 2/16/22. PET 2/2022 PARVEZ\par \par Chemo # 8 CMF onpro today 5/11/22. Tolerating well, occasional lightheadedness D7 last tx most likely 2/2 dehydration. She took BP at home stable. \par She reports mild-moderate fatigue and altered taste x 3-4 days after chemo. She was able to function, maintained PO intake, weight stable. She had mild nausea, took Reglan, no vomiting, no diarrhea or mouth sores. No Bone pain, no neuropathy, no fevers\par She has mild headache, resolved w/o meds. Mild hair thinning \par She will start RT in 3-4 weeks\par RT 4 m. mammo after RT

## 2022-05-25 NOTE — ASSESSMENT
[Curative] : Goals of care discussed with patient: Curative [FreeTextEntry1] : Ms. 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 She is s/p lumpectomy on 1/13/2022. Tumor size is 5 mm, 2 sentinel lymph nodes are negative but focal lymphovascular invasion was noted. Adj CMF started 2/16/2022. PET PARVEZ 2/2022\par \par - Breast ca: On ddCMF chemo, C #8/8, on 5/25/22 Tolerating well. She reports mild lightheadedness with position change,  most likely 2/2 dehydration. monitors B/P at home stable. encouraged fluid intake. will consider IV fluids if persists or worsens.\par Patient is on cytotoxic chemotherapy and needs intensive monitoring for severe toxicity.\par CBC reviewed with the patient today to make sure she is not neutropenic from high risk cancer therapy drug.  We will continue to monitor CBC every 2 to 3 weeks for intense drug monitoring. \par - RT with Dr Beach after chemo \par - No endocrine therapy needed. No role for platinumns, IO or xeloda\par - Chemo induced anemia-  grade 1 stable Hgb 10.9 No transfusion needed. Monitor counts\par - Mild leukocytosis secondary to ONPRO. Check cbc each cycle\par - Chemo induced diarrhea-  Imodium PRN. Maintain PO hydration. BRAT diet\par - Chemo induced N/V- Will get premedications with Emend, dexamethasone and Aloxi. She will continue dexamethasone for next 3 days for antinausea prophylaxis. She will use Reglan as needed. \par - GF induced bone pain- She will take Claritin\par - Mucositis- mild mouth sores , continue biotene  if worsens will start first mouthwash . \par - Transaminitis/ Elevated ALK phos- grade 1 elevation will monitor\par - Chemo induced dysgeusia and fatigue: Encourage p.o. fluids, small frequent meals.\par - Instructed to call office and go directly to the emergency room with fever more than 100.4, shaking chills, productive cough, sore throat, shortness of breath or urinary symptoms. Patient verbalized understanding and agreement.\par - Emotional support provided, all questions answered.\par \par RTO 4 m\par CHEMO and blood work  ordered in [facility]\par

## 2022-05-25 NOTE — PHYSICAL EXAM
[Fully active, able to carry on all pre-disease performance without restriction] : Status 0 - Fully active, able to carry on all pre-disease performance without restriction [Normal] : affect appropriate [de-identified] : left healed axillary and lumpectomy scar

## 2022-06-01 ENCOUNTER — NON-APPOINTMENT (OUTPATIENT)
Age: 77
End: 2022-06-01

## 2022-06-08 ENCOUNTER — APPOINTMENT (OUTPATIENT)
Dept: RADIATION ONCOLOGY | Facility: CLINIC | Age: 77
End: 2022-06-08
Payer: MEDICARE

## 2022-06-08 VITALS
HEART RATE: 77 BPM | DIASTOLIC BLOOD PRESSURE: 76 MMHG | HEIGHT: 62 IN | TEMPERATURE: 96.44 F | SYSTOLIC BLOOD PRESSURE: 119 MMHG | WEIGHT: 199.51 LBS | OXYGEN SATURATION: 98 % | RESPIRATION RATE: 17 BRPM | BODY MASS INDEX: 36.72 KG/M2

## 2022-06-08 PROCEDURE — 99212 OFFICE O/P EST SF 10 MIN: CPT | Mod: 25

## 2022-06-08 PROCEDURE — 77333 RADIATION TREATMENT AID(S): CPT | Mod: 26

## 2022-06-08 PROCEDURE — 77290 THER RAD SIMULAJ FIELD CPLX: CPT | Mod: 26

## 2022-06-08 RX ORDER — DEXAMETHASONE 4 MG/1
4 TABLET ORAL
Qty: 30 | Refills: 1 | Status: DISCONTINUED | COMMUNITY
Start: 2022-02-15 | End: 2022-06-08

## 2022-06-08 RX ORDER — METOCLOPRAMIDE 10 MG/1
10 TABLET ORAL EVERY 6 HOURS
Qty: 60 | Refills: 3 | Status: DISCONTINUED | COMMUNITY
Start: 2022-02-15 | End: 2022-06-08

## 2022-06-15 PROCEDURE — 77295 3-D RADIOTHERAPY PLAN: CPT | Mod: 26

## 2022-06-15 PROCEDURE — 77334 RADIATION TREATMENT AID(S): CPT | Mod: 26

## 2022-06-15 PROCEDURE — 77300 RADIATION THERAPY DOSE PLAN: CPT | Mod: 26

## 2022-06-21 ENCOUNTER — NON-APPOINTMENT (OUTPATIENT)
Age: 77
End: 2022-06-21

## 2022-06-21 PROCEDURE — 77280 THER RAD SIMULAJ FIELD SMPL: CPT | Mod: 26

## 2022-06-22 PROCEDURE — 77387C: CUSTOM

## 2022-06-22 PROCEDURE — 77427 RADIATION TX MANAGEMENT X5: CPT

## 2022-06-23 ENCOUNTER — NON-APPOINTMENT (OUTPATIENT)
Age: 77
End: 2022-06-23

## 2022-06-23 PROCEDURE — 77387C: CUSTOM

## 2022-06-23 NOTE — LETTER CLOSING
[Sincerely yours,] : Sincerely yours, [Consult Closing:] : Thank you for allowing me to participate in the care of this patient.  If you have any questions, please do not hesitate to contact me. [FreeTextEntry3] : Meseret Beach MD\par

## 2022-06-23 NOTE — HISTORY OF PRESENT ILLNESS
[FreeTextEntry1] : Ms. Crowe is a 78yo F with invasive ductal carcinoma of the left breast. She underwent lumpectomy and sentinel node biopsy followed by adjuvant chemotherapy with CMF x 8 cycles, completed on 5/25/22. Initial consultation was on 2/1/22 and she returns after chemotherapy for re-evaluation.  \par \par She underwent routine breast imaging with additional views on 5/21/2021(BIRADS 3) which showed small asymmetry was seen in the central left breast - questionable small nodules at the 3:00, 5:00 and 11:00 positions, with persistent small nodular asymmetry in the central posterior likely medial left breast by mammography, likely corresponding to a probable cyst at 10:00 on sonography. \par \par Left-sided mammogram and sonogram 6 months for close surveillance was performed in 11/17/2021( BI-RADS 4B) There was a 3 mm cyst vs.hypoechoic nodule in the left breast at 9:00, 6 cm FN, corresponding to a stable circumscribed nodule on mammogram. This appears somewhat irregular on sonography and ultrasound-guided biopsy was recommended.\par \par She underwent left breast, 9 o 'clock, ultrasound guided core biopsy core biopsy on 11/19/2021 which showed Invasive poorly differentiated ductal carcinoma with prominent lymphocytic infiltrate, Adrian score 8/9, measuring 0.3 cm, ER 0%, MT 0%, HER-2:Negative. There was no DCIS or LVI.\par \par She underwent a breast MRI on 12/02/2021 which showed the recently diagnosed left breast malignancy, a non-mass enhancement in the left outer retroareolar breast, possibly corresponding to a finding seen on ultrasound at 3:00 retroareolar for which biopsy is recommended. BL US on 12/09/2021(BIRADS 6) which showed a left breast 9:00 6 cm from the nipple 0.3 cm irregular shaped hypoechoic mass at site of biopsy-proven invasive cancer. There is a left breast 3:00 retroareolar bilobed hypoechoic nodule/complicated cyst which correlates with the MRI finding for which ultrasound-guided core biopsy and right breast 12:00 1 cm from the nipple 0.3 cm round irregular shaped hypoechoic mass with indistinct margins. \par \par She underwent bilateral breasts us core biopsy on 12/9/2021 the right breast 12 o'clock 1 cm FN biopsy revealed proliferative fibrocystic changes with focal moderate to florid usual ductal epithelial hyperplasia,duct ectasia showing two ducts with attenuated epithelial lining,thin fibrotic walls and surrounding mild chronic inflammation. The left breast, retroareolar biopsy at 3:00 revealed proliferative fibrocystic changes with moderate to florid usual ductal epithelial hyperplasia, cyst formation and apocrine metaplasia.\par \par She underwent left breast lumpectomy on 1/13/2022 which revealed invasive poorly differentiated ductal carcinoma with prominent chronic inflammatory infiltrate, Adrian Grade 3, measuring 0.5 cm, Margin were negative. Focal LVI. Additionally, high grade DCIS cribriform type with complex sclerosing lesion noted. Two sentinel lymph node were negative for metastasis.\par \par Genetic testing with [facility] [employer] genes from 1/26/22 showed no clinically significant variants. \par \par She underwent adjuvant chemotherapy with CMF x 8, completed on 5/25/22. She tolerated chemotherapy well, with side effects as hair loss and fatigue. She reports gaining weight during chemotherapy, 20 lbs.  She denies breast pain. She presents today for CT simulation for RT planning.

## 2022-06-23 NOTE — PHYSICAL EXAM
[Sclera] : the sclera and conjunctiva were normal [Outer Ear] : the ears and nose were normal in appearance [] : no rash [No Focal Deficits] : no focal deficits [Normal] : oriented to person, place and time, the affect was normal, the mood was normal and not anxious [Heart Rate And Rhythm] : heart rate and rhythm were normal [No UE Edema] : there is no upper extremity edema [Breast Abnormal Lactation (Galactorrhea)] : no nipple discharge [Abdomen Soft] : soft [Nondistended] : nondistended [Supraclavicular Lymph Nodes Enlarged Bilaterally] : supraclavicular [de-identified] : Left lumpectomy and axillary scars are well healed, no erythema

## 2022-06-24 PROCEDURE — 77387C: CUSTOM

## 2022-06-24 NOTE — HISTORY OF PRESENT ILLNESS
[FreeTextEntry1] : Ms. Crowe is a 77 year-old woman with prognostic group IB [id number] poorly differentiated triple negative invasive ductal carcinoma of the left breast. She underwent lumpectomy with negative margins and had a negative sentinel node biopsy. She received adjuvant chemotherapy with CMF, which she tolerated well. She is now receiving radiation therapy to the left breast. \par \par She is feeling generally well. She denies fatigue and pain. She has not noted any skin reactions. She is using Aquaphor on the skin. \par

## 2022-06-24 NOTE — DISEASE MANAGEMENT
[TTNM] : 1a [NTNM] : 0 [MTNM] : 0 [de-identified] : 530 cGy [de-identified] : 5240 cGy [de-identified] : left breast

## 2022-06-27 PROCEDURE — 77387C: CUSTOM

## 2022-06-27 NOTE — DISCUSSION/SUMMARY
[Cancer Type / Location / Histology Subtype: ________] : Cancer Type / Location / Histology Subtype: [unfilled] [Surgical Procedure / Location / Findings: _________] : Surgical Procedure / Location / Findings: [unfilled] [Radiation] : Radiation: Yes [Body Area Treated: _________] : Body Area Treated: [unfilled] [Genetic Counseling] : Genetic Counseling: No [Need for onging (adjuvant) treatment for cancer?] : Need for onging (adjuvant) treatment for cancer? No [FreeTextEntry1] : adjuvant dose dense CMF:\par cytoxan / methotrexate / 5 fluorouracil every 2 weeks x 8 cycles  2/16/2022  -  5/25/2022 [FreeTextEntry7] : History of bilateral oophorectomy prior to cancer diagnosis for benign condition\par Multiple excisional breast biopsies showing atypia, prior to 2019  [FreeTextEntry8] : BRIDGET Salas

## 2022-06-28 PROCEDURE — 77387C: CUSTOM

## 2022-06-29 ENCOUNTER — NON-APPOINTMENT (OUTPATIENT)
Age: 77
End: 2022-06-29

## 2022-06-29 PROCEDURE — 77387C: CUSTOM

## 2022-06-29 NOTE — DISEASE MANAGEMENT
[Pathological] : TNM Stage: p [IB] : IB [TTNM] : 1a [NTNM] : 0 [MTNM] : 0 [de-identified] : 1590 cGy [de-identified] : 5240 cGy [de-identified] : left breast

## 2022-06-29 NOTE — PHYSICAL EXAM
[Normal] : well developed, well nourished, in no acute distress [de-identified] : Left lumpectomy and axillary scars are well healed, no erythema.

## 2022-06-29 NOTE — HISTORY OF PRESENT ILLNESS
[FreeTextEntry1] : Ms. Crowe is a 77 year-old woman with prognostic group IB [id number] poorly differentiated triple negative invasive ductal carcinoma of the left breast. She underwent lumpectomy with negative margins and had a negative sentinel node biopsy. She received adjuvant chemotherapy with CMF, which she tolerated well. She is now receiving radiation therapy to the left breast. \par \par She is feeling generally well. She denies fatigue and pain. She has not noted any skin reactions. She has not started skin care as of yet.\par

## 2022-06-30 PROCEDURE — 77387C: CUSTOM

## 2022-07-01 PROCEDURE — 77427 RADIATION TX MANAGEMENT X5: CPT

## 2022-07-01 PROCEDURE — 77387C: CUSTOM

## 2022-07-05 PROCEDURE — 77387C: CUSTOM

## 2022-07-06 ENCOUNTER — NON-APPOINTMENT (OUTPATIENT)
Age: 77
End: 2022-07-06

## 2022-07-06 PROCEDURE — 77387C: CUSTOM

## 2022-07-06 PROCEDURE — 77280 THER RAD SIMULAJ FIELD SMPL: CPT | Mod: 26

## 2022-07-07 PROCEDURE — 77427 RADIATION TX MANAGEMENT X5: CPT

## 2022-07-07 PROCEDURE — 77387C: CUSTOM

## 2022-07-08 PROCEDURE — 77387C: CUSTOM

## 2022-07-11 PROCEDURE — 77387C: CUSTOM

## 2022-07-11 NOTE — PHYSICAL EXAM
[Normal] : well developed, well nourished, in no acute distress [de-identified] : Left lumpectomy and axillary scars are well healed, no erythema.

## 2022-07-11 NOTE — DISEASE MANAGEMENT
[Pathological] : TNM Stage: p [IB] : IB [TTNM] : 1a [NTNM] : 0 [MTNM] : 0 [de-identified] : 2551 cGy [de-identified] : 5240 cGy [de-identified] : left breast

## 2022-07-11 NOTE — HISTORY OF PRESENT ILLNESS
[FreeTextEntry1] : Ms. Crowe is a 77 year-old woman with prognostic group IB [id number] poorly differentiated triple negative invasive ductal carcinoma of the left breast. She underwent lumpectomy with negative margins and had a negative sentinel node biopsy. She received adjuvant chemotherapy with CMF, which she tolerated well. She is now receiving radiation therapy to the left breast. \par \par She is feeling generally well. She denies fatigue and pain. She has not noted any skin reactions.\par

## 2022-07-12 PROCEDURE — 77387C: CUSTOM

## 2022-07-13 ENCOUNTER — NON-APPOINTMENT (OUTPATIENT)
Age: 77
End: 2022-07-13

## 2022-07-13 PROCEDURE — 77387C: CUSTOM

## 2022-07-13 NOTE — PHYSICAL EXAM
[Normal] : well developed, well nourished, in no acute distress [de-identified] : Left lumpectomy and axillary scars are well healed, trace erythema.

## 2022-07-13 NOTE — HISTORY OF PRESENT ILLNESS
[FreeTextEntry1] : Ms. Crowe is a 77 year-old woman with prognostic group IB [id number] poorly differentiated triple negative invasive ductal carcinoma of the left breast. She underwent lumpectomy with negative margins and had a negative sentinel node biopsy. She received adjuvant chemotherapy with CMF, which she tolerated well. She is now receiving radiation therapy to the left breast. \par \par She is feeling generally well. She denies fatigue and pain. She has not noted any skin reactions.\par

## 2022-07-13 NOTE — DISEASE MANAGEMENT
[Pathological] : TNM Stage: p [IB] : IB [TTNM] : 1a [NTNM] : 0 [MTNM] : 0 [de-identified] : 3977 cGy [de-identified] : 5240 cGy [de-identified] : left breast

## 2022-07-14 PROCEDURE — 77387C: CUSTOM

## 2022-07-14 PROCEDURE — 77427 RADIATION TX MANAGEMENT X5: CPT

## 2022-07-15 PROCEDURE — 77334 RADIATION TREATMENT AID(S): CPT | Mod: 26

## 2022-07-15 PROCEDURE — 77321 SPECIAL TELETX PORT PLAN: CPT | Mod: 26

## 2022-07-20 ENCOUNTER — NON-APPOINTMENT (OUTPATIENT)
Age: 77
End: 2022-07-20

## 2022-07-27 ENCOUNTER — NON-APPOINTMENT (OUTPATIENT)
Age: 77
End: 2022-07-27

## 2022-07-27 NOTE — DISEASE MANAGEMENT
[Pathological] : TNM Stage: p [IB] : IB [TTNM] : 1a [NTNM] : 0 [MTNM] : 0 [de-identified] : 5240 cGy [de-identified] : 5240 cGy [de-identified] : left breast

## 2022-07-27 NOTE — HISTORY OF PRESENT ILLNESS
[FreeTextEntry1] : Ms. Crowe is a 77 year-old woman with prognostic group IB [id number] poorly differentiated triple negative invasive ductal carcinoma of the left breast. She underwent lumpectomy with negative margins and had a negative sentinel node biopsy. She received adjuvant chemotherapy with CMF, which she tolerated well. She is now receiving radiation therapy to the left breast. \par \par She completes radiation treatment today. She is feeling generally well. She denies fatigue and pain. She has not noted any skin reactions.\par

## 2022-08-29 ENCOUNTER — APPOINTMENT (OUTPATIENT)
Dept: RADIATION ONCOLOGY | Facility: CLINIC | Age: 77
End: 2022-08-29

## 2022-08-29 PROCEDURE — 99024 POSTOP FOLLOW-UP VISIT: CPT

## 2022-08-31 NOTE — HISTORY OF PRESENT ILLNESS
[FreeTextEntry1] : Ms. Crowe is a 77 year-old woman with prognostic group IB [id number] poorly differentiated triple negative invasive ductal carcinoma of the left breast. She underwent lumpectomy with negative margins and had a negative sentinel node biopsy. She received adjuvant chemotherapy with CMF. She completed a course of adjuvant radiation therapy to the left breast, a total dose of 5,240 cGy from 6/22/22 - 7/20/22. She comes today for a post-treatment evaluation. \par \par The patient reports feeling generally well. She is back to many of her usual activities. She has more energy and denies pain. She has a good appetite and denies dysphagia. She denies cough or shortness of breath.  There has been some improvement in the skin since completing radiation therapy.  \par \par \par

## 2022-08-31 NOTE — PHYSICAL EXAM
[Normal] : well developed, well nourished, in no acute distress [Sclera] : the sclera and conjunctiva were normal [] : no respiratory distress [Abdomen Soft] : soft [Nondistended] : nondistended [de-identified] : Left lumpectomy and axillary scars are well healed, no erythema, minimal residual patchy hyperpigmentation.

## 2022-09-23 ENCOUNTER — OUTPATIENT (OUTPATIENT)
Dept: OUTPATIENT SERVICES | Facility: HOSPITAL | Age: 77
LOS: 1 days | Discharge: HOME | End: 2022-09-23

## 2022-09-23 DIAGNOSIS — Z98.890 OTHER SPECIFIED POSTPROCEDURAL STATES: Chronic | ICD-10-CM

## 2022-09-23 DIAGNOSIS — Z90.722 ACQUIRED ABSENCE OF OVARIES, BILATERAL: Chronic | ICD-10-CM

## 2022-09-23 DIAGNOSIS — D64.9 ANEMIA, UNSPECIFIED: ICD-10-CM

## 2022-09-28 ENCOUNTER — RESULT REVIEW (OUTPATIENT)
Age: 77
End: 2022-09-28

## 2022-09-28 ENCOUNTER — APPOINTMENT (OUTPATIENT)
Dept: HEMATOLOGY ONCOLOGY | Facility: CLINIC | Age: 77
End: 2022-09-28

## 2022-09-28 VITALS
OXYGEN SATURATION: 97 % | HEART RATE: 79 BPM | SYSTOLIC BLOOD PRESSURE: 132 MMHG | TEMPERATURE: 97.1 F | WEIGHT: 202.83 LBS | RESPIRATION RATE: 16 BRPM | DIASTOLIC BLOOD PRESSURE: 75 MMHG | BODY MASS INDEX: 37.1 KG/M2

## 2022-09-28 LAB
BASOPHILS # BLD AUTO: 0.05 K/UL — SIGNIFICANT CHANGE UP (ref 0–0.2)
BASOPHILS NFR BLD AUTO: 1 % — SIGNIFICANT CHANGE UP (ref 0–2)
EOSINOPHIL # BLD AUTO: 0.11 K/UL — SIGNIFICANT CHANGE UP (ref 0–0.5)
EOSINOPHIL NFR BLD AUTO: 2.1 % — SIGNIFICANT CHANGE UP (ref 0–6)
HCT VFR BLD CALC: 37.4 % — SIGNIFICANT CHANGE UP (ref 34.5–45)
HGB BLD-MCNC: 11.5 G/DL — SIGNIFICANT CHANGE UP (ref 11.5–15.5)
IMM GRANULOCYTES NFR BLD AUTO: 0.4 % — SIGNIFICANT CHANGE UP (ref 0–0.9)
LYMPHOCYTES # BLD AUTO: 0.86 K/UL — LOW (ref 1–3.3)
LYMPHOCYTES # BLD AUTO: 16.5 % — SIGNIFICANT CHANGE UP (ref 13–44)
MCHC RBC-ENTMCNC: 27.9 PG — SIGNIFICANT CHANGE UP (ref 27–34)
MCHC RBC-ENTMCNC: 30.7 G/DL — LOW (ref 32–36)
MCV RBC AUTO: 90.8 FL — SIGNIFICANT CHANGE UP (ref 80–100)
MONOCYTES # BLD AUTO: 0.39 K/UL — SIGNIFICANT CHANGE UP (ref 0–0.9)
MONOCYTES NFR BLD AUTO: 7.5 % — SIGNIFICANT CHANGE UP (ref 2–14)
NEUTROPHILS # BLD AUTO: 3.79 K/UL — SIGNIFICANT CHANGE UP (ref 1.8–7.4)
NEUTROPHILS NFR BLD AUTO: 72.5 % — SIGNIFICANT CHANGE UP (ref 43–77)
NRBC # BLD: 0 /100 WBCS — SIGNIFICANT CHANGE UP (ref 0–0)
PLATELET # BLD AUTO: 267 K/UL — SIGNIFICANT CHANGE UP (ref 150–400)
RBC # BLD: 4.12 M/UL — SIGNIFICANT CHANGE UP (ref 3.8–5.2)
RBC # FLD: 13.2 % — SIGNIFICANT CHANGE UP (ref 10.3–14.5)
WBC # BLD: 5.22 K/UL — SIGNIFICANT CHANGE UP (ref 3.8–10.5)
WBC # FLD AUTO: 5.22 K/UL — SIGNIFICANT CHANGE UP (ref 3.8–10.5)

## 2022-09-28 PROCEDURE — 99213 OFFICE O/P EST LOW 20 MIN: CPT

## 2022-09-28 NOTE — HISTORY OF PRESENT ILLNESS
[T: ___] : T[unfilled] [N: ___] : N[unfilled] [M: ___] : M[unfilled] [AJCC Stage: ____] : AJCC Stage: [unfilled] [5 - Distress Level] : Distress Level: 5 [Patient given social work contact information and resource sheet] : Patient was given social work contact information and resource sheet [de-identified] : Ms.Ruth Crowe  is a 76 year old female here for an evaluation of breast cancer. Her oncologic history is as follows:\par \par She has a prior history of a benign excisional biopsy of the left breast in 2013, right breast in 1999 and an additional excisional biopsy with another surgeon at an unspecified time. Pathology demonstrated atypia but she has never been diagnosed with cancer. \par \par She underwent routine breast imaging on 5/21/2021(BIRADS 0) which showed  small asymmetry was seen in the central left breast for which additional views are recommended. There are questionable small nodules at the 3:00, 5:00 and 11:00 positions of the left breast on ultrasound for which a targeted ultrasound was recommended  \par Additional left breast mammo/sono on 5/31/21( BIRADS 3) revealed persistent small nodular asymmetry in the central posterior likely medial left breast by mammography, likely corresponding to a probable cyst at 10:00 on sonography. Follow-up left diagnostic mammography and targeted left breast sonography in 6 months are recommended to confirm stability of these findings.There is an additional small nodule in the upper left breast on mammo, with no sonogram correlate. for which 6 months follow-up left diagnostic mammography is recommended. There are probably benign findings on US at the 5:00 and 11:00 left breast for which follow-up targeted left breast sonography in 6 months is recommended.\par Left-sided mammogram and sonogram was performed in 11/17/2021( BI-RADS 4B) There is a 3 mm cyst vs.hypoechoic nodule in the left breast at 9:00, 6 cmfn, corresponding to a stable circumscribed nodule on mammogram. This appears somewhat irregular on sonography and ultrasound-guided biopsy is recommended.\par \par She underwent  left breast, 9 o 'clock, ultrasound guided core biopsy core biopsy on 11/19/2021 which showed Invasive poorly differentiated ductal carcinoma with prominent\par lymphocytic infiltrate,Adrian score 8/9, measuring 0.3 cm, ER:Negative, 0%, PgR:Negative, 0%, HER-2:Negative. No Ductal carcinoma in situ or microcalcifications present\par No lymphovascular permeation seen.\par She underwent a breast MRI on  12/02/2021(BI-RADS 4A) which showed recently diagnosed left breast malignancy, a non-mass enhancement in the left outer retroareolar breast, possibly corresponding to a finding seen on ultrasound at 3:00 retroareolar for which targeted left breast ultrasound and ultrasound-guided core needle biopsy is recommended.In addition, in view of the multiple bilateral enhancing foci and the presence of multiple masses on previous ultrasound studies, bilateral ultrasound is recommended at this time, to determine if any other masses in either breast demonstrate indeterminate or suspicious morphology warranting biopsy.\par \par She had BL US on  12/09/2021(BIRADS 6) which showed a left breast 9:00 6 cm from the nipple 0.3 cm irregular shaped hypoechoic mass at site of biopsy-proven invasive cancer. There is a left breast 3:00 retroareolar bilobed hypoechoic nodule/complicated cyst which correlates with the MRI finding for which ultrasound-guided core biopsy is rec.\par There is a right breast 12:00 1 cm from the nipple 0.3 cm round irregular shaped hypoechoic mass with indistinct margins. for which for ultrasound-guided core biopsy is rec.\par \par  She underwent bilateral breasts us core biopsy on 12/9/2021 the right breast 12 o'clock 1 cmfn biopsy revealed proliferative fibrocystic changes with focal moderate to florid usual ductal epithelial hyperplasia,duct ectasia showing two ducts with attenuated epithelial lining,thin fibrotic walls and surrounding mild chronic inflammation The left breast, retroareolar biopsy at 3:00 revealed proliferative fibrocystic changes with moderate to florid usual ductal epithelial hyperplasia, cyst formation and apocrine metaplasia.\par \par She underwent left breast lumpectomy on  1/13/2022 which revealed invasive poorly differentiated ductal carcinoma with prominent chronic inflammatory infiltrate,  [place] grade 3, measuring 0.5 cm, Margin were negative. Small focus of Lymphovascular invasion was noted. 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 She is Ashkenazi Confucianist.\par \par I discussed natural history and treatment options for early stage triple negative breast cancer.  Adjuvant chemotherapy is typically recommended for tumor size 6 mm or more. Triple negative breast cancer is aggressive and has a high risk for recurrence therefore chemotherapy can be considered for smaller T1 a tumors as well. She has LVI and therefore chemotherapy can be considered. Patient has very good organ function and performance status despite her age and is very highly motivated to get aggressive treatments. Patient wants to go ahead with the best possible chemotherapy option. I emphasized that Adriamycin based chemotherapy will be an overkill for T1 a tumor as well as will not be tolerable at her age therefore is not recommended. Patient said "age is just a number" and she is not ready to go anytime soon. She is very motivated to start chemotherapy ASAP. Patient reports that she came with an understanding that chemotherapy is on the table. \par \par Patient is very motivated and wants to get aggressive treatment upfront. She is willing to take chemotherapy with a good understanding of risks and benefits. We discussed that dose dense CMF will be appropriate adjuvant chemotherapy regimen for her with tolerable toxicity. Chemotherapy dose and schedule can be modified based on her tolerance as well.\par \par We discussed the data for adjuvant CMF. Adjuvant CMF is equivalent to AC x4 in terms of disease-free survival and overall survival based on randomized clinical trial data, but there is no direct comparison of ddACT or TC with CMF. Dose dense CMF was studied in a small feasibility study and we will follow the same protocol. (Kemi et al, -Clin Breast Cancer. 2010 Dec 1; 10(6): 924483)  <https://www.ncbi.nlm.nih.gov/entrez/eutils/elink.fcgi?dbfrom=pubmed&retmode=ref&cmd=prlinks&ct=19851860>.\par \par We discussed expected and unexpected side effects of chemotherapy, including but not limited to hair loss (minimal), fatigue, change in taste, mouth sores, nausea, vomiting, diarrhea, infusion reaction, allergic reaction, significant myelosuppression, need for blood transfusion, infection, bleeding, neuropathy, chemical cystitis, kidney injury, nerve pain, muscle pain and detrimental effect on liver function. Signed an informed consent after complete understanding of the risks, benefits and alternatives. Patient reports she has fatty liver. LFTs today are normal. We will keep an eye on her LFTs during chemotherapy.\par \par She asked other appropriate questions including the role of PARP inhibitors and immunotherapy. We discussed that she is not a candidate for Parp inhibitors in the adjuvant setting given the small disease. BRCA gene testing is pending. We reviewed the role of immunotherapy in the neoadjuvant setting followed by adjuvant setting for large node positive triple negative tumors. She also inquired the role of platinum therapy. We discussed toxicity associated with it and again no benefit in the adjuvant setting. We also discussed role of androgen receptor in triple negative breast cancer patients. We will ask pathology to check androgen receptor although the therapy is not indicated in the adjuvant setting.\par \par She is a candidate for radiation therapy. \par \par \par PET CT 02/04/2022 Mildly FDG avid amorphous soft tissue in the medial aspect of the left breast and mildly FDG avid soft tissue and low-attenuation density in the left axilla likely represents post surgical changes. Please correlate clinically and with follow-up study. No evidence of metastatic disease.\par 2. Nonspecific minimally FDG avid nodular soft tissue within the right breast, likely representing post biopsy changes. Correlate clinically\par 3. Nonspecific hypermetabolism in the right colon with no definite abnormality on CT. Please correlate clinically or with colonoscopy as warranted.\par Patient recently had colonoscopy she will f/u with GI prn\par EKG done , Chemo consent obtained \par Patient is a anxious about stating chemo today\par Emotional support given\par \par 5/2022\par Chemo # 8 CMF onpro today 5/11/22. Tolerating well, occasional lightheadedness D7 last tx most likely 2/2 dehydration. She took BP at home stable. \par She reports mild-moderate fatigue and altered taste x 3-4 days after chemo. She was able to function, maintained PO intake, weight stable. She had mild nausea, took Reglan, no vomiting, no diarrhea or mouth sores. No Bone pain, no neuropathy, no fevers\par She has mild headache, resolved w/o meds. Mild hair thinning \par She will start RT in 3-4 weeks\par RT 4 m. mammo after RT [de-identified] : Ms. BEN ANAND is a 76  year old postmenopausal female with stage 1A ( pT1a,N0,Mx) ER/DE/HER-2/gaudencio negative invasive poorly differentiated ductal carcinoma of the left breast. She is s/p lumpectomy on 1/13/2022. Tumor size is 5 mm, 2 sentinel lymph nodes are negative but focal lymphovascular invasion was noted. ddCMF started 2/16/22. PET 2/2022 PARVEZ. BRCA neg\par \par Chemo completed 5/2022. RT completed 7/2022\par S/e are mostly resolved\par Appetite good, energy good. \par She has gained 15 lbs during chemo and RT, rec to loose wt\par PET/CT 2/2022 PARVEZ. Colonoscopy june 2021 was good. \par mammogram 10/2022

## 2022-09-28 NOTE — PHYSICAL EXAM
[Fully active, able to carry on all pre-disease performance without restriction] : Status 0 - Fully active, able to carry on all pre-disease performance without restriction [Normal] : affect appropriate [de-identified] : left healed axillary and lumpectomy scar

## 2022-09-28 NOTE — ASSESSMENT
[Curative] : Goals of care discussed with patient: Curative [FreeTextEntry1] : Ms. BEN ANAND is a 76  year old postmenopausal female with stage 1A ( pT1a,N0,Mx) ER/MN/HER-2/gaudencio negative invasive poorly differentiated ductal carcinoma of the left breast. She is s/p lumpectomy on 1/13/2022. Tumor size is 5 mm, 2 sentinel lymph nodes are negative but focal lymphovascular invasion was noted. ddCMF started 2/16/22. PET 2/2022 PARVEZ. BRCA neg\par \par Chemo completed 5/2022. RT completed 7/2022\par S/e are mostly resolved\par Appetite good, energy good. \par She has gained 15 lbs during chemo and RT, rec to loose wt\par PET/CT 2/2022 PARVEZ. Colonoscopy june 2021 was good. \par mammogram 10/2022\par - No endocrine therapy needed. No role for platinumns, IO or xeloda\par - encourage healthy diet and exercise.\par - Continue followup with primary care. I recommend age-appropriate malignancy screening\par - Educated about s/sx of recurrence\par \par RTC q3  m or sooner if any new sx\par \par \par \par

## 2022-09-29 LAB
ALBUMIN SERPL ELPH-MCNC: 4.4 G/DL
ALP BLD-CCNC: 63 U/L
ALT SERPL-CCNC: 18 U/L
ANION GAP SERPL CALC-SCNC: 9 MMOL/L
AST SERPL-CCNC: 19 U/L
BILIRUB SERPL-MCNC: 0.2 MG/DL
BUN SERPL-MCNC: 18 MG/DL
CALCIUM SERPL-MCNC: 10.1 MG/DL
CANCER AG27-29 SERPL-ACNC: 11 U/ML
CEA SERPL-MCNC: 1.7 NG/ML
CHLORIDE SERPL-SCNC: 106 MMOL/L
CHOLEST SERPL-MCNC: 180 MG/DL
CO2 SERPL-SCNC: 28 MMOL/L
CREAT SERPL-MCNC: 0.8 MG/DL
EGFR: 76 ML/MIN/1.73M2
GLUCOSE SERPL-MCNC: 93 MG/DL
HDLC SERPL-MCNC: 67 MG/DL
LDLC SERPL CALC-MCNC: 80 MG/DL
NONHDLC SERPL-MCNC: 112 MG/DL
POTASSIUM SERPL-SCNC: 5.3 MMOL/L
PROT SERPL-MCNC: 6.5 G/DL
SODIUM SERPL-SCNC: 144 MMOL/L
T4 FREE SERPL-MCNC: 1.3 NG/DL
TRIGL SERPL-MCNC: 162 MG/DL
TSH SERPL-ACNC: 1.04 UIU/ML

## 2022-10-28 ENCOUNTER — APPOINTMENT (OUTPATIENT)
Dept: ULTRASOUND IMAGING | Facility: CLINIC | Age: 77
End: 2022-10-28

## 2022-10-28 ENCOUNTER — RESULT REVIEW (OUTPATIENT)
Age: 77
End: 2022-10-28

## 2022-10-28 ENCOUNTER — APPOINTMENT (OUTPATIENT)
Dept: MAMMOGRAPHY | Facility: CLINIC | Age: 77
End: 2022-10-28

## 2022-10-28 PROCEDURE — 77066 DX MAMMO INCL CAD BI: CPT

## 2022-10-28 PROCEDURE — 76641 ULTRASOUND BREAST COMPLETE: CPT | Mod: 50

## 2022-10-28 PROCEDURE — G0279: CPT

## 2023-01-05 ENCOUNTER — OUTPATIENT (OUTPATIENT)
Dept: OUTPATIENT SERVICES | Facility: HOSPITAL | Age: 78
LOS: 1 days | Discharge: HOME | End: 2023-01-05

## 2023-01-05 DIAGNOSIS — Z98.890 OTHER SPECIFIED POSTPROCEDURAL STATES: Chronic | ICD-10-CM

## 2023-01-05 DIAGNOSIS — Z90.722 ACQUIRED ABSENCE OF OVARIES, BILATERAL: Chronic | ICD-10-CM

## 2023-01-05 DIAGNOSIS — D64.9 ANEMIA, UNSPECIFIED: ICD-10-CM

## 2023-01-09 ENCOUNTER — APPOINTMENT (OUTPATIENT)
Dept: HEMATOLOGY ONCOLOGY | Facility: CLINIC | Age: 78
End: 2023-01-09
Payer: MEDICARE

## 2023-01-09 ENCOUNTER — RESULT REVIEW (OUTPATIENT)
Age: 78
End: 2023-01-09

## 2023-01-09 VITALS
SYSTOLIC BLOOD PRESSURE: 134 MMHG | TEMPERATURE: 96.7 F | BODY MASS INDEX: 36.69 KG/M2 | HEART RATE: 77 BPM | OXYGEN SATURATION: 98 % | WEIGHT: 200.62 LBS | RESPIRATION RATE: 16 BRPM | DIASTOLIC BLOOD PRESSURE: 82 MMHG

## 2023-01-09 LAB
BASOPHILS # BLD AUTO: 0.04 K/UL — SIGNIFICANT CHANGE UP (ref 0–0.2)
BASOPHILS NFR BLD AUTO: 0.7 % — SIGNIFICANT CHANGE UP (ref 0–2)
EOSINOPHIL # BLD AUTO: 0.16 K/UL — SIGNIFICANT CHANGE UP (ref 0–0.5)
EOSINOPHIL NFR BLD AUTO: 2.7 % — SIGNIFICANT CHANGE UP (ref 0–6)
HCT VFR BLD CALC: 37.9 % — SIGNIFICANT CHANGE UP (ref 34.5–45)
HGB BLD-MCNC: 12.1 G/DL — SIGNIFICANT CHANGE UP (ref 11.5–15.5)
IMM GRANULOCYTES NFR BLD AUTO: 0.3 % — SIGNIFICANT CHANGE UP (ref 0–0.9)
LYMPHOCYTES # BLD AUTO: 1.07 K/UL — SIGNIFICANT CHANGE UP (ref 1–3.3)
LYMPHOCYTES # BLD AUTO: 18.1 % — SIGNIFICANT CHANGE UP (ref 13–44)
MCHC RBC-ENTMCNC: 28.5 PG — SIGNIFICANT CHANGE UP (ref 27–34)
MCHC RBC-ENTMCNC: 31.9 G/DL — LOW (ref 32–36)
MCV RBC AUTO: 89.4 FL — SIGNIFICANT CHANGE UP (ref 80–100)
MONOCYTES # BLD AUTO: 0.46 K/UL — SIGNIFICANT CHANGE UP (ref 0–0.9)
MONOCYTES NFR BLD AUTO: 7.8 % — SIGNIFICANT CHANGE UP (ref 2–14)
NEUTROPHILS # BLD AUTO: 4.16 K/UL — SIGNIFICANT CHANGE UP (ref 1.8–7.4)
NEUTROPHILS NFR BLD AUTO: 70.4 % — SIGNIFICANT CHANGE UP (ref 43–77)
NRBC # BLD: 0 /100 WBCS — SIGNIFICANT CHANGE UP (ref 0–0)
PLATELET # BLD AUTO: 287 K/UL — SIGNIFICANT CHANGE UP (ref 150–400)
RBC # BLD: 4.24 M/UL — SIGNIFICANT CHANGE UP (ref 3.8–5.2)
RBC # FLD: 13.4 % — SIGNIFICANT CHANGE UP (ref 10.3–14.5)
WBC # BLD: 5.91 K/UL — SIGNIFICANT CHANGE UP (ref 3.8–10.5)
WBC # FLD AUTO: 5.91 K/UL — SIGNIFICANT CHANGE UP (ref 3.8–10.5)

## 2023-01-09 PROCEDURE — 99214 OFFICE O/P EST MOD 30 MIN: CPT

## 2023-01-09 NOTE — HISTORY OF PRESENT ILLNESS
[T: ___] : T[unfilled] [N: ___] : N[unfilled] [M: ___] : M[unfilled] [AJCC Stage: ____] : AJCC Stage: [unfilled] [5 - Distress Level] : Distress Level: 5 [Patient given social work contact information and resource sheet] : Patient was given social work contact information and resource sheet [de-identified] : Ms.Ruth Crowe  is a 76 year old female here for an evaluation of breast cancer. Her oncologic history is as follows:\par \par She has a prior history of a benign excisional biopsy of the left breast in 2013, right breast in 1999 and an additional excisional biopsy with another surgeon at an unspecified time. Pathology demonstrated atypia but she has never been diagnosed with cancer. \par \par She underwent routine breast imaging on 5/21/2021(BIRADS 0) which showed  small asymmetry was seen in the central left breast for which additional views are recommended. There are questionable small nodules at the 3:00, 5:00 and 11:00 positions of the left breast on ultrasound for which a targeted ultrasound was recommended  \par Additional left breast mammo/sono on 5/31/21( BIRADS 3) revealed persistent small nodular asymmetry in the central posterior likely medial left breast by mammography, likely corresponding to a probable cyst at 10:00 on sonography. Follow-up left diagnostic mammography and targeted left breast sonography in 6 months are recommended to confirm stability of these findings.There is an additional small nodule in the upper left breast on mammo, with no sonogram correlate. for which 6 months follow-up left diagnostic mammography is recommended. There are probably benign findings on US at the 5:00 and 11:00 left breast for which follow-up targeted left breast sonography in 6 months is recommended.\par Left-sided mammogram and sonogram was performed in 11/17/2021( BI-RADS 4B) There is a 3 mm cyst vs.hypoechoic nodule in the left breast at 9:00, 6 cmfn, corresponding to a stable circumscribed nodule on mammogram. This appears somewhat irregular on sonography and ultrasound-guided biopsy is recommended.\par \par She underwent  left breast, 9 o 'clock, ultrasound guided core biopsy core biopsy on 11/19/2021 which showed Invasive poorly differentiated ductal carcinoma with prominent\par lymphocytic infiltrate,Adrian score 8/9, measuring 0.3 cm, ER:Negative, 0%, PgR:Negative, 0%, HER-2:Negative. No Ductal carcinoma in situ or microcalcifications present\par No lymphovascular permeation seen.\par She underwent a breast MRI on  12/02/2021(BI-RADS 4A) which showed recently diagnosed left breast malignancy, a non-mass enhancement in the left outer retroareolar breast, possibly corresponding to a finding seen on ultrasound at 3:00 retroareolar for which targeted left breast ultrasound and ultrasound-guided core needle biopsy is recommended.In addition, in view of the multiple bilateral enhancing foci and the presence of multiple masses on previous ultrasound studies, bilateral ultrasound is recommended at this time, to determine if any other masses in either breast demonstrate indeterminate or suspicious morphology warranting biopsy.\par \par She had BL US on  12/09/2021(BIRADS 6) which showed a left breast 9:00 6 cm from the nipple 0.3 cm irregular shaped hypoechoic mass at site of biopsy-proven invasive cancer. There is a left breast 3:00 retroareolar bilobed hypoechoic nodule/complicated cyst which correlates with the MRI finding for which ultrasound-guided core biopsy is rec.\par There is a right breast 12:00 1 cm from the nipple 0.3 cm round irregular shaped hypoechoic mass with indistinct margins. for which for ultrasound-guided core biopsy is rec.\par \par  She underwent bilateral breasts us core biopsy on 12/9/2021 the right breast 12 o'clock 1 cmfn biopsy revealed proliferative fibrocystic changes with focal moderate to florid usual ductal epithelial hyperplasia,duct ectasia showing two ducts with attenuated epithelial lining,thin fibrotic walls and surrounding mild chronic inflammation The left breast, retroareolar biopsy at 3:00 revealed proliferative fibrocystic changes with moderate to florid usual ductal epithelial hyperplasia, cyst formation and apocrine metaplasia.\par \par She underwent left breast lumpectomy on  1/13/2022 which revealed invasive poorly differentiated ductal carcinoma with prominent chronic inflammatory infiltrate,  Lewellen grade 3, measuring 0.5 cm, Margin were negative. Small focus of Lymphovascular invasion was noted. 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 She is [employer].\par \par I discussed natural history and treatment options for early stage triple negative breast cancer.  Adjuvant chemotherapy is typically recommended for tumor size 6 mm or more. Triple negative breast cancer is aggressive and has a high risk for recurrence therefore chemotherapy can be considered for smaller T1 a tumors as well. She has LVI and therefore chemotherapy can be considered. Patient has very good organ function and performance status despite her age and is very highly motivated to get aggressive treatments. Patient wants to go ahead with the best possible chemotherapy option. I emphasized that Adriamycin based chemotherapy will be an overkill for T1 a tumor as well as will not be tolerable at her age therefore is not recommended. Patient said "age is just a number" and she is not ready to go anytime soon. She is very motivated to start chemotherapy ASAP. Patient reports that she came with an understanding that chemotherapy is on the table. \par \par Patient is very motivated and wants to get aggressive treatment upfront. She is willing to take chemotherapy with a good understanding of risks and benefits. We discussed that dose dense CMF will be appropriate adjuvant chemotherapy regimen for her with tolerable toxicity. Chemotherapy dose and schedule can be modified based on her tolerance as well.\par \par We discussed the data for adjuvant CMF. Adjuvant CMF is equivalent to AC x4 in terms of disease-free survival and overall survival based on randomized clinical trial data, but there is no direct comparison of ddACT or TC with CMF. Dose dense CMF was studied in a small feasibility study and we will follow the same protocol. (Kemi et al, -Clin Breast Cancer. 2010 Dec 1; 10(6): 946221)  <https://www.ncbi.nlm.nih.gov/entrez/eutils/elink.fcgi?dbfrom=pubmed&retmode=ref&cmd=prlinks&xg=40606689>.\par \par We discussed expected and unexpected side effects of chemotherapy, including but not limited to hair loss (minimal), fatigue, change in taste, mouth sores, nausea, vomiting, diarrhea, infusion reaction, allergic reaction, significant myelosuppression, need for blood transfusion, infection, bleeding, neuropathy, chemical cystitis, kidney injury, nerve pain, muscle pain and detrimental effect on liver function. Signed an informed consent after complete understanding of the risks, benefits and alternatives. Patient reports she has fatty liver. LFTs today are normal. We will keep an eye on her LFTs during chemotherapy.\par \par She asked other appropriate questions including the role of PARP inhibitors and immunotherapy. We discussed that she is not a candidate for Parp inhibitors in the adjuvant setting given the small disease. BRCA gene testing is pending. We reviewed the role of immunotherapy in the neoadjuvant setting followed by adjuvant setting for large node positive triple negative tumors. She also inquired the role of platinum therapy. We discussed toxicity associated with it and again no benefit in the adjuvant setting. We also discussed role of androgen receptor in triple negative breast cancer patients. We will ask pathology to check androgen receptor although the therapy is not indicated in the adjuvant setting.\par \par She is a candidate for radiation therapy. \par \par \par PET CT 02/04/2022 Mildly FDG avid amorphous soft tissue in the medial aspect of the left breast and mildly FDG avid soft tissue and low-attenuation density in the left axilla likely represents post surgical changes. Please correlate clinically and with follow-up study. No evidence of metastatic disease.\par 2. Nonspecific minimally FDG avid nodular soft tissue within the right breast, likely representing post biopsy changes. Correlate clinically\par 3. Nonspecific hypermetabolism in the right colon with no definite abnormality on CT. Please correlate clinically or with colonoscopy as warranted.\par Patient recently had colonoscopy she will f/u with GI prn\par EKG done , Chemo consent obtained \par Patient is a anxious about stating chemo today\par Emotional support given\par \par 5/2022\par Chemo # 8 CMF onpro today 5/11/22. Tolerating well, occasional lightheadedness D7 last tx most likely 2/2 dehydration. She took BP at home stable. \par She reports mild-moderate fatigue and altered taste x 3-4 days after chemo. She was able to function, maintained PO intake, weight stable. She had mild nausea, took Reglan, no vomiting, no diarrhea or mouth sores. No Bone pain, no neuropathy, no fevers\par She has mild headache, resolved w/o meds. Mild hair thinning \par She will start RT in 3-4 weeks\par RT 4 m. mammo after RT [de-identified] : Ms. 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 She is s/p lumpectomy on 1/13/2022. Tumor size is 5 mm, 2 sentinel lymph nodes are negative but focal lymphovascular invasion was noted. ddCMF started 2/16/22. PET 2/2022 [facility]. BRCA neg\par \par Chemo completed 5/2022. RT completed 7/2022\par S/e are mostly resolved\par Appetite good, energy good. \par She has gained 15 lbs during chemo and RT, rec to loose wt\par PET/CT 2/2022 [facility]. Colonoscopy june 2021 was good. \par mammogram 10/2022\par \par 1/2023\par She reports headaches x 1 m, in the morning, dx with retinal issue. Relived with excerderin. Will obtain brain MRI\par She has back pain, worse than before. \par PET 2/2022 reviewed. Will repeat PET scan to f/u on previous findings and to evaluate back pain\par BW today

## 2023-01-09 NOTE — ASSESSMENT
[Curative] : Goals of care discussed with patient: Curative [FreeTextEntry1] : Ms. 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 She is s/p lumpectomy on 1/13/2022. Tumor size is 5 mm, 2 sentinel lymph nodes are negative but focal lymphovascular invasion was noted. ddCMF started 2/16/22. PET 2/2022 [facility]. BRCA neg. Chemo completed 5/2022. RT completed 7/2022\par \par \par BREAST CA: She has gained 15 lbs during chemo and RT, rec to loose wt\par \par 1/2023\par She reports headaches x 1 m, in the morning, dx with retinal issue. Relived with excerderin. Will obtain brain MRI\par She has back pain, worse than before. \par PET 2/2022 reviewed. Will repeat PET scan to f/u on previous findings and to evaluate back pain\par BW today \par \par PET/CT 2/2022 [facility]. Colonoscopy june 2021 was good. \par mammogram 10/2022 birads 2 \par - No endocrine therapy needed. No role for platinumns, IO or xeloda\par - encourage healthy diet and exercise.\par - Continue followup with primary care. I recommend age-appropriate malignancy screening\par - Educated about s/sx of recurrence\par \par RTC q3  m or sooner if any new sx\par \par \par \par

## 2023-01-09 NOTE — REASON FOR VISIT
[Follow-Up Visit] : a follow-up [Other: _____] : [unfilled] [FreeTextEntry2] : invasive poorly differentiated ductal carcinoma of the left breast

## 2023-01-09 NOTE — PHYSICAL EXAM
[Fully active, able to carry on all pre-disease performance without restriction] : Status 0 - Fully active, able to carry on all pre-disease performance without restriction [Normal] : affect appropriate [de-identified] : left healed axillary and lumpectomy scar

## 2023-01-10 LAB
ALBUMIN SERPL ELPH-MCNC: 4.6 G/DL
ALP BLD-CCNC: 65 U/L
ALT SERPL-CCNC: 9 U/L
ANION GAP SERPL CALC-SCNC: 14 MMOL/L
AST SERPL-CCNC: 19 U/L
BILIRUB SERPL-MCNC: 0.2 MG/DL
BUN SERPL-MCNC: 18 MG/DL
CALCIUM SERPL-MCNC: 9.6 MG/DL
CANCER AG27-29 SERPL-ACNC: 13.5 U/ML
CEA SERPL-MCNC: 1.9 NG/ML
CHLORIDE SERPL-SCNC: 104 MMOL/L
CHOLEST SERPL-MCNC: 167 MG/DL
CO2 SERPL-SCNC: 24 MMOL/L
CREAT SERPL-MCNC: 0.84 MG/DL
EGFR: 72 ML/MIN/1.73M2
ESTIMATED AVERAGE GLUCOSE: 114 MG/DL
GLUCOSE SERPL-MCNC: 97 MG/DL
HBA1C MFR BLD HPLC: 5.6 %
HDLC SERPL-MCNC: 79 MG/DL
LDLC SERPL CALC-MCNC: 72 MG/DL
NONHDLC SERPL-MCNC: 89 MG/DL
POTASSIUM SERPL-SCNC: 5.4 MMOL/L
PROT SERPL-MCNC: 6.8 G/DL
SODIUM SERPL-SCNC: 142 MMOL/L
TRIGL SERPL-MCNC: 84 MG/DL
TSH SERPL-ACNC: 1.06 UIU/ML

## 2023-01-11 ENCOUNTER — OUTPATIENT (OUTPATIENT)
Dept: OUTPATIENT SERVICES | Facility: HOSPITAL | Age: 78
LOS: 1 days | End: 2023-01-11
Payer: MEDICARE

## 2023-01-11 ENCOUNTER — APPOINTMENT (OUTPATIENT)
Dept: MRI IMAGING | Facility: CLINIC | Age: 78
End: 2023-01-11
Payer: MEDICARE

## 2023-01-11 ENCOUNTER — NON-APPOINTMENT (OUTPATIENT)
Age: 78
End: 2023-01-11

## 2023-01-11 ENCOUNTER — RESULT REVIEW (OUTPATIENT)
Age: 78
End: 2023-01-11

## 2023-01-11 DIAGNOSIS — Z98.890 OTHER SPECIFIED POSTPROCEDURAL STATES: Chronic | ICD-10-CM

## 2023-01-11 DIAGNOSIS — C50.912 MALIGNANT NEOPLASM OF UNSPECIFIED SITE OF LEFT FEMALE BREAST: ICD-10-CM

## 2023-01-11 DIAGNOSIS — Z90.722 ACQUIRED ABSENCE OF OVARIES, BILATERAL: Chronic | ICD-10-CM

## 2023-01-11 PROCEDURE — 70553 MRI BRAIN STEM W/O & W/DYE: CPT

## 2023-01-11 PROCEDURE — 70553 MRI BRAIN STEM W/O & W/DYE: CPT | Mod: 26,MH

## 2023-01-11 PROCEDURE — A9585: CPT

## 2023-01-13 ENCOUNTER — OUTPATIENT (OUTPATIENT)
Dept: OUTPATIENT SERVICES | Facility: HOSPITAL | Age: 78
LOS: 1 days | End: 2023-01-13
Payer: MEDICARE

## 2023-01-13 ENCOUNTER — APPOINTMENT (OUTPATIENT)
Dept: MRI IMAGING | Facility: CLINIC | Age: 78
End: 2023-01-13
Payer: MEDICARE

## 2023-01-13 DIAGNOSIS — C50.912 MALIGNANT NEOPLASM OF UNSPECIFIED SITE OF LEFT FEMALE BREAST: ICD-10-CM

## 2023-01-13 DIAGNOSIS — Z98.890 OTHER SPECIFIED POSTPROCEDURAL STATES: Chronic | ICD-10-CM

## 2023-01-13 DIAGNOSIS — Z90.722 ACQUIRED ABSENCE OF OVARIES, BILATERAL: Chronic | ICD-10-CM

## 2023-01-13 PROCEDURE — 70544 MR ANGIOGRAPHY HEAD W/O DYE: CPT | Mod: 26,MH,76

## 2023-01-13 PROCEDURE — 70544 MR ANGIOGRAPHY HEAD W/O DYE: CPT | Mod: 26,MH

## 2023-01-13 PROCEDURE — 70544 MR ANGIOGRAPHY HEAD W/O DYE: CPT

## 2023-01-18 ENCOUNTER — APPOINTMENT (OUTPATIENT)
Dept: NUCLEAR MEDICINE | Facility: CLINIC | Age: 78
End: 2023-01-18
Payer: MEDICARE

## 2023-01-18 ENCOUNTER — OUTPATIENT (OUTPATIENT)
Dept: OUTPATIENT SERVICES | Facility: HOSPITAL | Age: 78
LOS: 1 days | End: 2023-01-18
Payer: MEDICARE

## 2023-01-18 ENCOUNTER — APPOINTMENT (OUTPATIENT)
Dept: CT IMAGING | Facility: CLINIC | Age: 78
End: 2023-01-18

## 2023-01-18 DIAGNOSIS — C50.912 MALIGNANT NEOPLASM OF UNSPECIFIED SITE OF LEFT FEMALE BREAST: ICD-10-CM

## 2023-01-18 DIAGNOSIS — Z98.890 OTHER SPECIFIED POSTPROCEDURAL STATES: Chronic | ICD-10-CM

## 2023-01-18 DIAGNOSIS — Z90.722 ACQUIRED ABSENCE OF OVARIES, BILATERAL: Chronic | ICD-10-CM

## 2023-01-18 PROCEDURE — A9552: CPT

## 2023-01-18 PROCEDURE — 70496 CT ANGIOGRAPHY HEAD: CPT

## 2023-01-18 PROCEDURE — 70496 CT ANGIOGRAPHY HEAD: CPT | Mod: 26,MH

## 2023-01-18 PROCEDURE — 78815 PET IMAGE W/CT SKULL-THIGH: CPT | Mod: 26,PS,MH

## 2023-01-18 PROCEDURE — 78815 PET IMAGE W/CT SKULL-THIGH: CPT

## 2023-01-24 ENCOUNTER — NON-APPOINTMENT (OUTPATIENT)
Age: 78
End: 2023-01-24

## 2023-01-25 ENCOUNTER — APPOINTMENT (OUTPATIENT)
Dept: SURGICAL ONCOLOGY | Facility: CLINIC | Age: 78
End: 2023-01-25
Payer: MEDICARE

## 2023-01-25 VITALS
DIASTOLIC BLOOD PRESSURE: 77 MMHG | BODY MASS INDEX: 36.8 KG/M2 | HEART RATE: 69 BPM | OXYGEN SATURATION: 95 % | HEIGHT: 62 IN | RESPIRATION RATE: 16 BRPM | SYSTOLIC BLOOD PRESSURE: 120 MMHG | WEIGHT: 200 LBS

## 2023-01-25 PROCEDURE — 99213 OFFICE O/P EST LOW 20 MIN: CPT

## 2023-01-25 NOTE — ASSESSMENT
[FreeTextEntry1] : IMP:\par Newly diagnosed invasive ductal carcinoma of the left breast at 9:00, 6 cmfn \par Now s/p left breast magseed localized lumpectomy and left axillary SLNB for left breast cancer on 1/13/2022. Final pathology revealed a 5 mm invasive poorly differentiated ductal carcinoma with prominent chronic inflammatory infiltrate, 2 mm DCIS, cribriform type with high nuclear grade, complex sclerosing lesion, focal lympho-vascular invasion is identified, negative margins, 0/2 lymph nodes.  ER/ID/HER2(-).  \par Tumor stage: pT1a pN0 ; Triple negative breast cancer. \par No evidence of infection \par \par \par PLAN:\par Continue to follow with med/onc\par Return in one year\par MMG/US Oct 2023- ordered\par \par All medical entries were at my, Dr. Ant Becerril, direction. I have reviewed the chart and agree that the record accurately reflects my personal performance of the history, physical exam, assessment and plan. Our office Nurse Practitioner was present of the duration of the office visit.

## 2023-01-25 NOTE — PHYSICAL EXAM
[Normal Supraclavicular Lymph Nodes] : normal supraclavicular lymph nodes [Normal Axillary Lymph Nodes] : normal axillary lymph nodes [Normal] : oriented to person, place and time, with appropriate affect [FreeTextEntry1] : SC present for exam  [de-identified] : Normal S1,S2. Regular rate and rhythm  [de-identified] : Complete breast exam performed in supine and upright position. No palpable masses, tenderness, nipple discharge, inversion, deviation or enlarged axillary or supraclavicular lymph nodes bilaterally.  [de-identified] : Clear breath sounds bilaterally with normal respiratory effort.

## 2023-01-25 NOTE — HISTORY OF PRESENT ILLNESS
[de-identified] : Ms. Elissa Crowe is a 77 year female presents for a follow up visit. She is s/p left breast magseed localized lumpectomy and left axillary SLNB for left breast cancer on 1/13/2022. Final pathology revealed a 5 mm invasive poorly differentiated ductal carcinoma with prominent chronic inflammatory infiltrate, 2 mm DCIS, cribriform type with high nuclear grade, complex sclerosing lesion, focal lympho-vascular invasion is identified, negative margins, 0/2 lymph nodes.  ER/HI/HER2(-).  Tumor stage: pT1a pN0 ; Triple negative breast cancer. \par \par Completed adjuvant chemo 5/2022 and RT 7/2022\par \par Most recent MMG/US on 10/28/22 reveals no evidence of malignancy. BI-RADS 2\par \par Today, She denies palpable breast masses, nipple discharge, skin changes, inversion or breast pain. Denies constitutional symptoms. No health changes. \par \par \par PERTINENT HISTORY:\par She has a prior history of a benign excisional biopsy of the left breast in 2013, right breast in 1999 and an additional excisional biopsy with another surgeon at an unspecified time.  Pathology demonstrated atypia but she has never been diagnosed with cancer.  \par \par She completed an annual mammogram and sonogram in May 2021.  A small asymmetry was seen in the left breast for which additional views are recommended.  There are questionable small nodules in the left breast on ultrasound for which a targeted ultrasound was recommended (BIRADS 0).  Subsequent left-sided mammogram and sonogram performed 5/31/21 revealed a persistent small nodular asymmetry in the left breast on mammogram likely corresponding to a probable cyst on ultrasound.  Follow up diagnostic left breast mammogram and sonogram in 6 months are recommended.  There is an additional small nodule in the upper left breast on mammogram with no sonogram correlate- 6 month follow up diagnostic mammogram recommended.  There are probably benign findings on ultrasound in the left breast for which 6 month follow up left breast ultrasound is recommended (BIRADS 3). \par \par Left-sided mammogram and sonogram was performed in November 2021.  There is a 3 mm cyst vs.hypoechoic nodule in the left breast at 9:00, 6 cmfn, corresponding to a stable circumscribed nodule on mammogram.  This appears somewhat irregular on sonography and ultrasound-guided biopsy is recommended (BIRADS 4b).\par \par Biopsy was performed on 11/19/21.  Pathology demonstrated invasive ductal carcinoma, ER/HI/HER2 status pending.\par \par Her past medical history includes hypertension, hypothyroidism and hyperlipidemia.  She is currently undergoing a work up with endocrinology because she believes she is having hormonal issues.  Past surgical history includes a bilateral oophorectomy for a benign condition) and a D&C.  She has a family history of breast cancer involving a maternal first cousin at age 62.  \par \par \par Ob/gyn: Dr. Tia Mckeon

## 2023-02-19 ENCOUNTER — NON-APPOINTMENT (OUTPATIENT)
Age: 78
End: 2023-02-19

## 2023-04-19 ENCOUNTER — NON-APPOINTMENT (OUTPATIENT)
Age: 78
End: 2023-04-19

## 2023-04-22 ENCOUNTER — OUTPATIENT (OUTPATIENT)
Dept: OUTPATIENT SERVICES | Facility: HOSPITAL | Age: 78
LOS: 1 days | Discharge: HOME | End: 2023-04-22

## 2023-04-22 DIAGNOSIS — Z90.722 ACQUIRED ABSENCE OF OVARIES, BILATERAL: Chronic | ICD-10-CM

## 2023-04-22 DIAGNOSIS — D64.9 ANEMIA, UNSPECIFIED: ICD-10-CM

## 2023-04-22 DIAGNOSIS — Z98.890 OTHER SPECIFIED POSTPROCEDURAL STATES: Chronic | ICD-10-CM

## 2023-04-26 ENCOUNTER — RESULT REVIEW (OUTPATIENT)
Age: 78
End: 2023-04-26

## 2023-04-26 ENCOUNTER — APPOINTMENT (OUTPATIENT)
Dept: HEMATOLOGY ONCOLOGY | Facility: CLINIC | Age: 78
End: 2023-04-26
Payer: MEDICARE

## 2023-04-26 VITALS
HEART RATE: 70 BPM | DIASTOLIC BLOOD PRESSURE: 77 MMHG | SYSTOLIC BLOOD PRESSURE: 135 MMHG | TEMPERATURE: 97 F | WEIGHT: 198.39 LBS | RESPIRATION RATE: 16 BRPM | OXYGEN SATURATION: 97 % | BODY MASS INDEX: 36.29 KG/M2

## 2023-04-26 LAB
BASOPHILS # BLD AUTO: 0.03 K/UL — SIGNIFICANT CHANGE UP (ref 0–0.2)
BASOPHILS NFR BLD AUTO: 0.5 % — SIGNIFICANT CHANGE UP (ref 0–2)
EOSINOPHIL # BLD AUTO: 0.12 K/UL — SIGNIFICANT CHANGE UP (ref 0–0.5)
EOSINOPHIL NFR BLD AUTO: 2.1 % — SIGNIFICANT CHANGE UP (ref 0–6)
HCT VFR BLD CALC: 37.3 % — SIGNIFICANT CHANGE UP (ref 34.5–45)
HGB BLD-MCNC: 11.8 G/DL — SIGNIFICANT CHANGE UP (ref 11.5–15.5)
IMM GRANULOCYTES NFR BLD AUTO: 0.9 % — SIGNIFICANT CHANGE UP (ref 0–0.9)
LYMPHOCYTES # BLD AUTO: 1.01 K/UL — SIGNIFICANT CHANGE UP (ref 1–3.3)
LYMPHOCYTES # BLD AUTO: 17.9 % — SIGNIFICANT CHANGE UP (ref 13–44)
MCHC RBC-ENTMCNC: 28.4 PG — SIGNIFICANT CHANGE UP (ref 27–34)
MCHC RBC-ENTMCNC: 31.6 G/DL — LOW (ref 32–36)
MCV RBC AUTO: 89.7 FL — SIGNIFICANT CHANGE UP (ref 80–100)
MONOCYTES # BLD AUTO: 0.43 K/UL — SIGNIFICANT CHANGE UP (ref 0–0.9)
MONOCYTES NFR BLD AUTO: 7.6 % — SIGNIFICANT CHANGE UP (ref 2–14)
NEUTROPHILS # BLD AUTO: 4.01 K/UL — SIGNIFICANT CHANGE UP (ref 1.8–7.4)
NEUTROPHILS NFR BLD AUTO: 71 % — SIGNIFICANT CHANGE UP (ref 43–77)
NRBC # BLD: 0 /100 WBCS — SIGNIFICANT CHANGE UP (ref 0–0)
PLATELET # BLD AUTO: 311 K/UL — SIGNIFICANT CHANGE UP (ref 150–400)
RBC # BLD: 4.16 M/UL — SIGNIFICANT CHANGE UP (ref 3.8–5.2)
RBC # FLD: 13.7 % — SIGNIFICANT CHANGE UP (ref 10.3–14.5)
WBC # BLD: 5.65 K/UL — SIGNIFICANT CHANGE UP (ref 3.8–10.5)
WBC # FLD AUTO: 5.65 K/UL — SIGNIFICANT CHANGE UP (ref 3.8–10.5)

## 2023-04-26 PROCEDURE — 99213 OFFICE O/P EST LOW 20 MIN: CPT

## 2023-04-26 NOTE — HISTORY OF PRESENT ILLNESS
[T: ___] : T[unfilled] [N: ___] : N[unfilled] [M: ___] : M[unfilled] [AJCC Stage: ____] : AJCC Stage: [unfilled] [5 - Distress Level] : Distress Level: 5 [Patient given social work contact information and resource sheet] : Patient was given social work contact information and resource sheet [de-identified] : Ms.Ruth Crowe  is a 76 year old female here for an evaluation of breast cancer. Her oncologic history is as follows:\par \par She has a prior history of a benign excisional biopsy of the left breast in 2013, right breast in 1999 and an additional excisional biopsy with another surgeon at an unspecified time. Pathology demonstrated atypia but she has never been diagnosed with cancer. \par \par She underwent routine breast imaging on 5/21/2021(BIRADS 0) which showed  small asymmetry was seen in the central left breast for which additional views are recommended. There are questionable small nodules at the 3:00, 5:00 and 11:00 positions of the left breast on ultrasound for which a targeted ultrasound was recommended  \par Additional left breast mammo/sono on 5/31/21( BIRADS 3) revealed persistent small nodular asymmetry in the central posterior likely medial left breast by mammography, likely corresponding to a probable cyst at 10:00 on sonography. Follow-up left diagnostic mammography and targeted left breast sonography in 6 months are recommended to confirm stability of these findings.There is an additional small nodule in the upper left breast on mammo, with no sonogram correlate. for which 6 months follow-up left diagnostic mammography is recommended. There are probably benign findings on US at the 5:00 and 11:00 left breast for which follow-up targeted left breast sonography in 6 months is recommended.\par Left-sided mammogram and sonogram was performed in 11/17/2021( BI-RADS 4B) There is a 3 mm cyst vs.hypoechoic nodule in the left breast at 9:00, 6 cmfn, corresponding to a stable circumscribed nodule on mammogram. This appears somewhat irregular on sonography and ultrasound-guided biopsy is recommended.\par \par She underwent  left breast, 9 o 'clock, ultrasound guided core biopsy core biopsy on 11/19/2021 which showed Invasive poorly differentiated ductal carcinoma with prominent\par lymphocytic infiltrate,Adrian score 8/9, measuring 0.3 cm, ER:Negative, 0%, PgR:Negative, 0%, HER-2:Negative. No Ductal carcinoma in situ or microcalcifications present\par No lymphovascular permeation seen.\par She underwent a breast MRI on  12/02/2021(BI-RADS 4A) which showed recently diagnosed left breast malignancy, a non-mass enhancement in the left outer retroareolar breast, possibly corresponding to a finding seen on ultrasound at 3:00 retroareolar for which targeted left breast ultrasound and ultrasound-guided core needle biopsy is recommended.In addition, in view of the multiple bilateral enhancing foci and the presence of multiple masses on previous ultrasound studies, bilateral ultrasound is recommended at this time, to determine if any other masses in either breast demonstrate indeterminate or suspicious morphology warranting biopsy.\par \par She had BL US on  12/09/2021(BIRADS 6) which showed a left breast 9:00 6 cm from the nipple 0.3 cm irregular shaped hypoechoic mass at site of biopsy-proven invasive cancer. There is a left breast 3:00 retroareolar bilobed hypoechoic nodule/complicated cyst which correlates with the MRI finding for which ultrasound-guided core biopsy is rec.\par There is a right breast 12:00 1 cm from the nipple 0.3 cm round irregular shaped hypoechoic mass with indistinct margins. for which for ultrasound-guided core biopsy is rec.\par \par  She underwent bilateral breasts us core biopsy on 12/9/2021 the right breast 12 o'clock 1 cmfn biopsy revealed proliferative fibrocystic changes with focal moderate to florid usual ductal epithelial hyperplasia,duct ectasia showing two ducts with attenuated epithelial lining,thin fibrotic walls and surrounding mild chronic inflammation The left breast, retroareolar biopsy at 3:00 revealed proliferative fibrocystic changes with moderate to florid usual ductal epithelial hyperplasia, cyst formation and apocrine metaplasia.\par \par She underwent left breast lumpectomy on  1/13/2022 which revealed invasive poorly differentiated ductal carcinoma with prominent chronic inflammatory infiltrate,  Nahma grade 3, measuring 0.5 cm, Margin were negative. Small focus of Lymphovascular invasion was noted. 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 She is [employer].\par \par I discussed natural history and treatment options for early stage triple negative breast cancer.  Adjuvant chemotherapy is typically recommended for tumor size 6 mm or more. Triple negative breast cancer is aggressive and has a high risk for recurrence therefore chemotherapy can be considered for smaller T1 a tumors as well. She has LVI and therefore chemotherapy can be considered. Patient has very good organ function and performance status despite her age and is very highly motivated to get aggressive treatments. Patient wants to go ahead with the best possible chemotherapy option. I emphasized that Adriamycin based chemotherapy will be an overkill for T1 a tumor as well as will not be tolerable at her age therefore is not recommended. Patient said "age is just a number" and she is not ready to go anytime soon. She is very motivated to start chemotherapy ASAP. Patient reports that she came with an understanding that chemotherapy is on the table. \par \par Patient is very motivated and wants to get aggressive treatment upfront. She is willing to take chemotherapy with a good understanding of risks and benefits. We discussed that dose dense CMF will be appropriate adjuvant chemotherapy regimen for her with tolerable toxicity. Chemotherapy dose and schedule can be modified based on her tolerance as well.\par \par We discussed the data for adjuvant CMF. Adjuvant CMF is equivalent to AC x4 in terms of disease-free survival and overall survival based on randomized clinical trial data, but there is no direct comparison of ddACT or TC with CMF. Dose dense CMF was studied in a small feasibility study and we will follow the same protocol. (Kemi et al, -Clin Breast Cancer. 2010 Dec 1; 10(6): 450705)  <https://www.ncbi.nlm.nih.gov/entrez/eutils/elink.fcgi?dbfrom=pubmed&retmode=ref&cmd=prlinks&xz=14064378>.\par \par We discussed expected and unexpected side effects of chemotherapy, including but not limited to hair loss (minimal), fatigue, change in taste, mouth sores, nausea, vomiting, diarrhea, infusion reaction, allergic reaction, significant myelosuppression, need for blood transfusion, infection, bleeding, neuropathy, chemical cystitis, kidney injury, nerve pain, muscle pain and detrimental effect on liver function. Signed an informed consent after complete understanding of the risks, benefits and alternatives. Patient reports she has fatty liver. LFTs today are normal. We will keep an eye on her LFTs during chemotherapy.\par \par She asked other appropriate questions including the role of PARP inhibitors and immunotherapy. We discussed that she is not a candidate for Parp inhibitors in the adjuvant setting given the small disease. BRCA gene testing is pending. We reviewed the role of immunotherapy in the neoadjuvant setting followed by adjuvant setting for large node positive triple negative tumors. She also inquired the role of platinum therapy. We discussed toxicity associated with it and again no benefit in the adjuvant setting. We also discussed role of androgen receptor in triple negative breast cancer patients. We will ask pathology to check androgen receptor although the therapy is not indicated in the adjuvant setting.\par \par She is a candidate for radiation therapy. \par \par \par PET CT 02/04/2022 Mildly FDG avid amorphous soft tissue in the medial aspect of the left breast and mildly FDG avid soft tissue and low-attenuation density in the left axilla likely represents post surgical changes. Please correlate clinically and with follow-up study. No evidence of metastatic disease.\par 2. Nonspecific minimally FDG avid nodular soft tissue within the right breast, likely representing post biopsy changes. Correlate clinically\par 3. Nonspecific hypermetabolism in the right colon with no definite abnormality on CT. Please correlate clinically or with colonoscopy as warranted.\par Patient recently had colonoscopy she will f/u with GI prn\par EKG done , Chemo consent obtained \par Patient is a anxious about stating chemo today\par Emotional support given\par \par 5/2022\par Chemo # 8 CMF onpro today 5/11/22. Tolerating well, occasional lightheadedness D7 last tx most likely 2/2 dehydration. She took BP at home stable. \par She reports mild-moderate fatigue and altered taste x 3-4 days after chemo. She was able to function, maintained PO intake, weight stable. She had mild nausea, took Reglan, no vomiting, no diarrhea or mouth sores. No Bone pain, no neuropathy, no fevers\par She has mild headache, resolved w/o meds. Mild hair thinning \par She will start RT in 3-4 weeks\par RT 4 m. mammo after RT\par \par 1/2023\par She reports headaches x 1 m, in the morning, dx with retinal issue. Relived with excerderin. Will obtain brain MRI\par She has back pain, worse than before. \par PET 2/2022 reviewed. Will repeat PET scan to f/u on previous findings and to evaluate back pain\par BW today  [de-identified] : Ms. 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 She is s/p lumpectomy on 1/13/2022. Tumor size is 5 mm, 2 sentinel lymph nodes are negative but focal lymphovascular invasion was noted. ddCMF started 2/16/22. PET 2/2022 [facility]. BRCA neg\par \par 4/2023\par She had covid 10 days ago, fevers resolved after 4 days. Mild cough and congestion now. No SOB\par s/p remdesivir \par Chemo completed 5/2022. RT completed 7/2022\par S/e are mostly resolved\par Appetite good, energy good. \par She has gained 15 lbs during chemo and RT, rec to loose wt\par PET/CT 2/2022 [facility]. Colonoscopy june 2021 was good. \par PET 2/2023 [facility]. \par MRI head, CTa head- [facility] 2/2023 ( headaches) . Headaches better \par mammogram 10/2022\par

## 2023-04-26 NOTE — ASSESSMENT
[Curative] : Goals of care discussed with patient: Curative [FreeTextEntry1] : Ms. 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 She is s/p lumpectomy on 1/13/2022. Tumor size is 5 mm, 2 sentinel lymph nodes are negative but focal lymphovascular invasion was noted. ddCMF started 2/16/22. PET 2/2022 [facility]. BRCA neg. Chemo completed 5/2022. RT completed 7/2022\par \par \par BREAST CA: \par 4/2023\par She had covid 10 days ago, fevers resolved after 4 days. Mild cough and congestion now. No SOB\par s/p remdesivir \par Chemo completed 5/2022. RT completed 7/2022\par S/e are mostly resolved\par Appetite good, energy good. \par She has gained 15 lbs during chemo and RT, rec to loose wt\par PET/CT 2/2022 [facility]. Colonoscopy june 2021 was good. \par PET 2/2023 [facility]. \par MRI head, CTa head- [facility] 2/2023 ( headaches) . Headaches better \par mammogram 10/2022\par \par - No endocrine therapy needed. No role for platinumns, IO or xeloda\par - encourage healthy diet and exercise.\par - Continue followup with primary care. I recommend age-appropriate malignancy screening\par - Educated about s/sx of recurrence\par \par RTC q3  m or sooner if any new sx\par \par \par \par

## 2023-04-30 LAB
ALBUMIN SERPL ELPH-MCNC: 4.6 G/DL
ALP BLD-CCNC: 58 U/L
ALT SERPL-CCNC: 10 U/L
ANION GAP SERPL CALC-SCNC: 13 MMOL/L
AST SERPL-CCNC: 19 U/L
BILIRUB SERPL-MCNC: 0.2 MG/DL
BUN SERPL-MCNC: 19 MG/DL
CALCIUM SERPL-MCNC: 9.8 MG/DL
CANCER AG27-29 SERPL-ACNC: 15.1 U/ML
CEA SERPL-MCNC: 1.6 NG/ML
CHLORIDE SERPL-SCNC: 104 MMOL/L
CHOLEST SERPL-MCNC: 152 MG/DL
CO2 SERPL-SCNC: 23 MMOL/L
CREAT SERPL-MCNC: 0.83 MG/DL
EGFR: 73 ML/MIN/1.73M2
GLUCOSE SERPL-MCNC: 96 MG/DL
HDLC SERPL-MCNC: 59 MG/DL
LDLC SERPL CALC-MCNC: 64 MG/DL
NONHDLC SERPL-MCNC: 93 MG/DL
POTASSIUM SERPL-SCNC: 5 MMOL/L
PROT SERPL-MCNC: 6.8 G/DL
SODIUM SERPL-SCNC: 140 MMOL/L
T4 FREE SERPL-MCNC: 1.7 NG/DL
TRIGL SERPL-MCNC: 146 MG/DL
TSH SERPL-ACNC: 0.97 UIU/ML

## 2023-05-19 ENCOUNTER — APPOINTMENT (OUTPATIENT)
Dept: OBGYN | Facility: CLINIC | Age: 78
End: 2023-05-19
Payer: MEDICARE

## 2023-05-19 VITALS
HEIGHT: 62 IN | WEIGHT: 203 LBS | DIASTOLIC BLOOD PRESSURE: 78 MMHG | BODY MASS INDEX: 37.36 KG/M2 | SYSTOLIC BLOOD PRESSURE: 134 MMHG

## 2023-05-19 DIAGNOSIS — Z01.411 ENCOUNTER FOR GYNECOLOGICAL EXAMINATION (GENERAL) (ROUTINE) WITH ABNORMAL FINDINGS: ICD-10-CM

## 2023-05-19 DIAGNOSIS — N90.5 ATROPHY OF VULVA: ICD-10-CM

## 2023-05-19 PROCEDURE — G0101: CPT | Mod: GA

## 2023-05-19 PROCEDURE — 77080 DXA BONE DENSITY AXIAL: CPT

## 2023-05-19 PROCEDURE — G0328 FECAL BLOOD SCRN IMMUNOASSAY: CPT | Mod: GA,QW

## 2023-05-22 LAB — HPV HIGH+LOW RISK DNA PNL CVX: NOT DETECTED

## 2023-05-23 LAB — CYTOLOGY CVX/VAG DOC THIN PREP: ABNORMAL

## 2023-07-20 ENCOUNTER — OUTPATIENT (OUTPATIENT)
Dept: OUTPATIENT SERVICES | Facility: HOSPITAL | Age: 78
LOS: 1 days | Discharge: HOME | End: 2023-07-20

## 2023-07-20 DIAGNOSIS — D64.9 ANEMIA, UNSPECIFIED: ICD-10-CM

## 2023-07-20 DIAGNOSIS — Z90.722 ACQUIRED ABSENCE OF OVARIES, BILATERAL: Chronic | ICD-10-CM

## 2023-07-20 DIAGNOSIS — Z98.890 OTHER SPECIFIED POSTPROCEDURAL STATES: Chronic | ICD-10-CM

## 2023-07-31 ENCOUNTER — APPOINTMENT (OUTPATIENT)
Dept: HEMATOLOGY ONCOLOGY | Facility: CLINIC | Age: 78
End: 2023-07-31

## 2023-08-02 ENCOUNTER — APPOINTMENT (OUTPATIENT)
Dept: HEMATOLOGY ONCOLOGY | Facility: CLINIC | Age: 78
End: 2023-08-02
Payer: MEDICARE

## 2023-08-02 ENCOUNTER — RESULT REVIEW (OUTPATIENT)
Age: 78
End: 2023-08-02

## 2023-08-02 VITALS
SYSTOLIC BLOOD PRESSURE: 131 MMHG | RESPIRATION RATE: 16 BRPM | HEART RATE: 67 BPM | WEIGHT: 210.54 LBS | OXYGEN SATURATION: 99 % | BODY MASS INDEX: 38.51 KG/M2 | DIASTOLIC BLOOD PRESSURE: 83 MMHG | TEMPERATURE: 97.8 F

## 2023-08-02 DIAGNOSIS — C50.919 MALIGNANT NEOPLASM OF UNSPECIFIED SITE OF UNSPECIFIED FEMALE BREAST: ICD-10-CM

## 2023-08-02 DIAGNOSIS — M85.80 OTHER SPECIFIED DISORDERS OF BONE DENSITY AND STRUCTURE, UNSPECIFIED SITE: ICD-10-CM

## 2023-08-02 DIAGNOSIS — R63.5 ABNORMAL WEIGHT GAIN: ICD-10-CM

## 2023-08-02 DIAGNOSIS — R51.9 HEADACHE, UNSPECIFIED: ICD-10-CM

## 2023-08-02 LAB
BASOPHILS # BLD AUTO: 0.06 K/UL — SIGNIFICANT CHANGE UP (ref 0–0.2)
BASOPHILS NFR BLD AUTO: 0.9 % — SIGNIFICANT CHANGE UP (ref 0–2)
EOSINOPHIL # BLD AUTO: 0.2 K/UL — SIGNIFICANT CHANGE UP (ref 0–0.5)
EOSINOPHIL NFR BLD AUTO: 3.1 % — SIGNIFICANT CHANGE UP (ref 0–6)
HCT VFR BLD CALC: 34.3 % — LOW (ref 34.5–45)
HGB BLD-MCNC: 11.1 G/DL — LOW (ref 11.5–15.5)
IMM GRANULOCYTES NFR BLD AUTO: 0.3 % — SIGNIFICANT CHANGE UP (ref 0–0.9)
LYMPHOCYTES # BLD AUTO: 1 K/UL — SIGNIFICANT CHANGE UP (ref 1–3.3)
LYMPHOCYTES # BLD AUTO: 15.5 % — SIGNIFICANT CHANGE UP (ref 13–44)
MCHC RBC-ENTMCNC: 28.8 PG — SIGNIFICANT CHANGE UP (ref 27–34)
MCHC RBC-ENTMCNC: 32.4 G/DL — SIGNIFICANT CHANGE UP (ref 32–36)
MCV RBC AUTO: 88.9 FL — SIGNIFICANT CHANGE UP (ref 80–100)
MONOCYTES # BLD AUTO: 0.52 K/UL — SIGNIFICANT CHANGE UP (ref 0–0.9)
MONOCYTES NFR BLD AUTO: 8 % — SIGNIFICANT CHANGE UP (ref 2–14)
NEUTROPHILS # BLD AUTO: 4.67 K/UL — SIGNIFICANT CHANGE UP (ref 1.8–7.4)
NEUTROPHILS NFR BLD AUTO: 72.2 % — SIGNIFICANT CHANGE UP (ref 43–77)
NRBC # BLD: 0 /100 WBCS — SIGNIFICANT CHANGE UP (ref 0–0)
PLATELET # BLD AUTO: 261 K/UL — SIGNIFICANT CHANGE UP (ref 150–400)
RBC # BLD: 3.86 M/UL — SIGNIFICANT CHANGE UP (ref 3.8–5.2)
RBC # FLD: 13.9 % — SIGNIFICANT CHANGE UP (ref 10.3–14.5)
WBC # BLD: 6.47 K/UL — SIGNIFICANT CHANGE UP (ref 3.8–10.5)
WBC # FLD AUTO: 6.47 K/UL — SIGNIFICANT CHANGE UP (ref 3.8–10.5)

## 2023-08-02 PROCEDURE — 99214 OFFICE O/P EST MOD 30 MIN: CPT

## 2023-08-02 RX ORDER — CALCIUM CARB/VIT D2/MINERALS
TABLET ORAL
Refills: 0 | Status: DISCONTINUED | COMMUNITY
End: 2023-08-02

## 2023-08-02 RX ORDER — OMEPRAZOLE 40 MG/1
40 CAPSULE, DELAYED RELEASE ORAL
Qty: 30 | Refills: 2 | Status: DISCONTINUED | COMMUNITY
Start: 2022-02-15 | End: 2023-08-02

## 2023-08-03 PROBLEM — R63.5 WEIGHT GAIN: Status: ACTIVE | Noted: 2023-08-03

## 2023-08-03 LAB
ALBUMIN SERPL ELPH-MCNC: 4.4 G/DL
ALP BLD-CCNC: 70 U/L
ALT SERPL-CCNC: 17 U/L
ANION GAP SERPL CALC-SCNC: 14 MMOL/L
AST SERPL-CCNC: 19 U/L
BILIRUB SERPL-MCNC: 0.2 MG/DL
BUN SERPL-MCNC: 23 MG/DL
CALCIUM SERPL-MCNC: 9 MG/DL
CANCER AG27-29 SERPL-ACNC: 14.2 U/ML
CEA SERPL-MCNC: 1.8 NG/ML
CHLORIDE SERPL-SCNC: 103 MMOL/L
CO2 SERPL-SCNC: 21 MMOL/L
CREAT SERPL-MCNC: 0.92 MG/DL
EGFR: 64 ML/MIN/1.73M2
ESTIMATED AVERAGE GLUCOSE: 114 MG/DL
GLUCOSE SERPL-MCNC: 94 MG/DL
HBA1C MFR BLD HPLC: 5.6 %
POTASSIUM SERPL-SCNC: 4.7 MMOL/L
PROT SERPL-MCNC: 6.9 G/DL
SODIUM SERPL-SCNC: 138 MMOL/L
T4 FREE SERPL-MCNC: 1.2 NG/DL
TSH SERPL-ACNC: 1.46 UIU/ML

## 2023-08-03 NOTE — ASSESSMENT
[Curative] : Goals of care discussed with patient: Curative [FreeTextEntry1] : Ms. 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 She is s/p lumpectomy on 1/13/2022. Tumor size is 5 mm, 2 sentinel lymph nodes are negative but focal lymphovascular invasion was noted. ddCMF started 2/16/22. PET 2/2022 [facility]. BRCA neg. Chemo completed 5/2022. RT completed 7/2022   BREAST CA:  8/2/2023 She feels good overall but is beginning to gain weight despite exercise, she is trying to eat healthy, Hair has grown back. she plays cards with her friends weekly. home improvement projects She had covid 4/2023, fevers resolved after 4 days. s/p remdesivir, no residual symptoms no cough or sob now Chemo completed 5/2022. RT completed 7/2022 S/e are completely resolved, no CIPN Appetite good, energy good. Reports ongoing occasional headache relieved with excedrin CTa head- [facility] 2/2023l DEXA 5/2023 with GYN: Osteopenia rec to continue vit D suppls PET/CT 2/2022 [facility]. Colonoscopy june 2021 was good.  PET 2/2023 [facility].  MRI head, CTa head- [facility] 2/2023 ( headaches) . Headaches better  mammogram 10/2022 - No endocrine therapy needed. No role for platinumns, IO or xeloda - Weight gain- ongoing weight gain since stopping chemo, rec to loose wt,  encourage healthy diet and exercise. - Continue follow up with primary care. I recommend age-appropriate malignancy screening - Educated about s/sx of recurrence  RTC q3  m or sooner if any new sx

## 2023-08-03 NOTE — PHYSICAL EXAM
[Fully active, able to carry on all pre-disease performance without restriction] : Status 0 - Fully active, able to carry on all pre-disease performance without restriction [Normal] : affect appropriate [de-identified] : left healed axillary and lumpectomy scar

## 2023-08-03 NOTE — HISTORY OF PRESENT ILLNESS
[T: ___] : T[unfilled] [N: ___] : N[unfilled] [M: ___] : M[unfilled] [AJCC Stage: ____] : AJCC Stage: [unfilled] [5 - Distress Level] : Distress Level: 5 [Patient given social work contact information and resource sheet] : Patient was given social work contact information and resource sheet [de-identified] : Ms.Ruth Crowe  is a 76 year old female here for an evaluation of breast cancer. Her oncologic history is as follows:  She has a prior history of a benign excisional biopsy of the left breast in 2013, right breast in 1999 and an additional excisional biopsy with another surgeon at an unspecified time. Pathology demonstrated atypia but she has never been diagnosed with cancer.   She underwent routine breast imaging on 5/21/2021(BIRADS 0) which showed  small asymmetry was seen in the central left breast for which additional views are recommended. There are questionable small nodules at the 3:00, 5:00 and 11:00 positions of the left breast on ultrasound for which a targeted ultrasound was recommended   Additional left breast mammo/sono on 5/31/21( BIRADS 3) revealed persistent small nodular asymmetry in the central posterior likely medial left breast by mammography, likely corresponding to a probable cyst at 10:00 on sonography. Follow-up left diagnostic mammography and targeted left breast sonography in 6 months are recommended to confirm stability of these findings.There is an additional small nodule in the upper left breast on mammo, with no sonogram correlate. for which 6 months follow-up left diagnostic mammography is recommended. There are probably benign findings on US at the 5:00 and 11:00 left breast for which follow-up targeted left breast sonography in 6 months is recommended. Left-sided mammogram and sonogram was performed in 11/17/2021( BI-RADS 4B) There is a 3 mm cyst vs.hypoechoic nodule in the left breast at 9:00, 6 cmfn, corresponding to a stable circumscribed nodule on mammogram. This appears somewhat irregular on sonography and ultrasound-guided biopsy is recommended.  She underwent  left breast, 9 o 'clock, ultrasound guided core biopsy core biopsy on 11/19/2021 which showed Invasive poorly differentiated ductal carcinoma with prominent lymphocytic infiltrate,Adrian score 8/9, measuring 0.3 cm, ER:Negative, 0%, PgR:Negative, 0%, HER-2:Negative. No Ductal carcinoma in situ or microcalcifications present No lymphovascular permeation seen. She underwent a breast MRI on  12/02/2021(BI-RADS 4A) which showed recently diagnosed left breast malignancy, a non-mass enhancement in the left outer retroareolar breast, possibly corresponding to a finding seen on ultrasound at 3:00 retroareolar for which targeted left breast ultrasound and ultrasound-guided core needle biopsy is recommended.In addition, in view of the multiple bilateral enhancing foci and the presence of multiple masses on previous ultrasound studies, bilateral ultrasound is recommended at this time, to determine if any other masses in either breast demonstrate indeterminate or suspicious morphology warranting biopsy.  She had BL US on  12/09/2021(BIRADS 6) which showed a left breast 9:00 6 cm from the nipple 0.3 cm irregular shaped hypoechoic mass at site of biopsy-proven invasive cancer. There is a left breast 3:00 retroareolar bilobed hypoechoic nodule/complicated cyst which correlates with the MRI finding for which ultrasound-guided core biopsy is rec. There is a right breast 12:00 1 cm from the nipple 0.3 cm round irregular shaped hypoechoic mass with indistinct margins. for which for ultrasound-guided core biopsy is rec.   She underwent bilateral breasts us core biopsy on 12/9/2021 the right breast 12 o'clock 1 cmfn biopsy revealed proliferative fibrocystic changes with focal moderate to florid usual ductal epithelial hyperplasia,duct ectasia showing two ducts with attenuated epithelial lining,thin fibrotic walls and surrounding mild chronic inflammation The left breast, retroareolar biopsy at 3:00 revealed proliferative fibrocystic changes with moderate to florid usual ductal epithelial hyperplasia, cyst formation and apocrine metaplasia.  She underwent left breast lumpectomy on  1/13/2022 which revealed invasive poorly differentiated ductal carcinoma with prominent chronic inflammatory infiltrate,  [place] grade 3, measuring 0.5 cm, Margin were negative. Small focus of Lymphovascular invasion was noted. 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   She had GENETIC Testing 1/26/22, results pending. She is Ashkenazi Sabianist.  I discussed natural history and treatment options for early stage triple negative breast cancer.  Adjuvant chemotherapy is typically recommended for tumor size 6 mm or more. Triple negative breast cancer is aggressive and has a high risk for recurrence therefore chemotherapy can be considered for smaller T1 a tumors as well. She has LVI and therefore chemotherapy can be considered. Patient has very good organ function and performance status despite her age and is very highly motivated to get aggressive treatments. Patient wants to go ahead with the best possible chemotherapy option. I emphasized that Adriamycin based chemotherapy will be an overkill for T1 a tumor as well as will not be tolerable at her age therefore is not recommended. Patient said "age is just a number" and she is not ready to go anytime soon. She is very motivated to start chemotherapy ASAP. Patient reports that she came with an understanding that chemotherapy is on the table.   Patient is very motivated and wants to get aggressive treatment upfront. She is willing to take chemotherapy with a good understanding of risks and benefits. We discussed that dose dense CMF will be appropriate adjuvant chemotherapy regimen for her with tolerable toxicity. Chemotherapy dose and schedule can be modified based on her tolerance as well.  We discussed the data for adjuvant CMF. Adjuvant CMF is equivalent to AC x4 in terms of disease-free survival and overall survival based on randomized clinical trial data, but there is no direct comparison of ddACT or TC with CMF. Dose dense CMF was studied in a small feasibility study and we will follow the same protocol. (Kemi et al, -Clin Breast Cancer. 2010 Dec 1; 10(6): 218776)  <https://www.ncbi.nlm.nih.gov/entrez/eutils/elink.fcgi?dbfrom=pubmed&retmode=ref&cmd=prlinks&tp=29887030>.  We discussed expected and unexpected side effects of chemotherapy, including but not limited to hair loss (minimal), fatigue, change in taste, mouth sores, nausea, vomiting, diarrhea, infusion reaction, allergic reaction, significant myelosuppression, need for blood transfusion, infection, bleeding, neuropathy, chemical cystitis, kidney injury, nerve pain, muscle pain and detrimental effect on liver function. Signed an informed consent after complete understanding of the risks, benefits and alternatives. Patient reports she has fatty liver. LFTs today are normal. We will keep an eye on her LFTs during chemotherapy.  She asked other appropriate questions including the role of PARP inhibitors and immunotherapy. We discussed that she is not a candidate for Parp inhibitors in the adjuvant setting given the small disease. BRCA gene testing is pending. We reviewed the role of immunotherapy in the neoadjuvant setting followed by adjuvant setting for large node positive triple negative tumors. She also inquired the role of platinum therapy. We discussed toxicity associated with it and again no benefit in the adjuvant setting. We also discussed role of androgen receptor in triple negative breast cancer patients. We will ask pathology to check androgen receptor although the therapy is not indicated in the adjuvant setting.  She is a candidate for radiation therapy.    PET CT 02/04/2022 Mildly FDG avid amorphous soft tissue in the medial aspect of the left breast and mildly FDG avid soft tissue and low-attenuation density in the left axilla likely represents post surgical changes. Please correlate clinically and with follow-up study. No evidence of metastatic disease. 2. Nonspecific minimally FDG avid nodular soft tissue within the right breast, likely representing post biopsy changes. Correlate clinically 3. Nonspecific hypermetabolism in the right colon with no definite abnormality on CT. Please correlate clinically or with colonoscopy as warranted. Patient recently had colonoscopy she will f/u with GI prn EKG done , Chemo consent obtained  Patient is a anxious about stating chemo today Emotional support given  5/2022 Chemo # 8 CMF onpro today 5/11/22. Tolerating well, occasional lightheadedness D7 last tx most likely 2/2 dehydration. She took BP at home stable.  She reports mild-moderate fatigue and altered taste x 3-4 days after chemo. She was able to function, maintained PO intake, weight stable. She had mild nausea, took Reglan, no vomiting, no diarrhea or mouth sores. No Bone pain, no neuropathy, no fevers She has mild headache, resolved w/o meds. Mild hair thinning  She will start RT in 3-4 weeks RT 4 m. mammo after RT  1/2023 She reports headaches x 1 m, in the morning, dx with retinal issue. Relived with excerderin. Will obtain brain MRI She has back pain, worse than before.  PET 2/2022 reviewed. Will repeat PET scan to f/u on previous findings and to evaluate back pain BW today  [de-identified] : Ms. 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 She is s/p lumpectomy on 1/13/2022. Tumor size is 5 mm, 2 sentinel lymph nodes are negative but focal lymphovascular invasion was noted. ddCMF started 2/16/22. PET 2/2022 PARVEZ. BRCA neg  8/2/2023 She feels good overall but is beginning to gain weight despite exercise, she is trying to eat healthy, Hair has grown back. she plays cards with her friends weekly. home improvement projects She had covid 4/2023, fevers resolved after 4 days. s/p remdesivir, no residual symptoms no cough or sob now Chemo completed 5/2022. RT completed 7/2022 S/e are completely resolved, no CIPN Appetite good, energy good. Reports ongoing occasional headache relieved with excedrin CTa head- PARVEZ 2/2023l She has gained 30 lbs since chemo and RT, rec to loose wt DEXA 5/2023 with GYN: Osteopenia PET/CT 2/2022 PARVEZ. Colonoscopy June 2021 was good.  PET 2/2023 PARVEZ.  MRI head, CTa head- PARVEZ 2/2023 ( headaches) . Headaches better  mammogram 10/2022

## 2023-08-03 NOTE — END OF VISIT
[FreeTextEntry3] : Patient being seen as per physician's primary plan of care. [Time Spent: ___ minutes] : I have spent [unfilled] minutes of time on the encounter.

## 2023-08-03 NOTE — RESULTS/DATA
[FreeTextEntry1] : Laboratory data, radiology and pathology reviewed in detail at the time of visit.\par

## 2023-10-31 ENCOUNTER — APPOINTMENT (OUTPATIENT)
Dept: MAMMOGRAPHY | Facility: CLINIC | Age: 78
End: 2023-10-31
Payer: MEDICARE

## 2023-10-31 ENCOUNTER — APPOINTMENT (OUTPATIENT)
Dept: ULTRASOUND IMAGING | Facility: CLINIC | Age: 78
End: 2023-10-31
Payer: MEDICARE

## 2023-10-31 ENCOUNTER — RESULT REVIEW (OUTPATIENT)
Age: 78
End: 2023-10-31

## 2023-10-31 PROCEDURE — 77066 DX MAMMO INCL CAD BI: CPT

## 2023-10-31 PROCEDURE — 76641 ULTRASOUND BREAST COMPLETE: CPT | Mod: 50,GA

## 2023-10-31 PROCEDURE — G0279: CPT

## 2023-11-07 ENCOUNTER — OUTPATIENT (OUTPATIENT)
Dept: OUTPATIENT SERVICES | Facility: HOSPITAL | Age: 78
LOS: 1 days | Discharge: HOME | End: 2023-11-07

## 2023-11-07 DIAGNOSIS — Z98.890 OTHER SPECIFIED POSTPROCEDURAL STATES: Chronic | ICD-10-CM

## 2023-11-07 DIAGNOSIS — D64.9 ANEMIA, UNSPECIFIED: ICD-10-CM

## 2023-11-07 DIAGNOSIS — Z90.722 ACQUIRED ABSENCE OF OVARIES, BILATERAL: Chronic | ICD-10-CM

## 2023-11-17 ENCOUNTER — RESULT REVIEW (OUTPATIENT)
Age: 78
End: 2023-11-17

## 2023-11-17 ENCOUNTER — APPOINTMENT (OUTPATIENT)
Dept: HEMATOLOGY ONCOLOGY | Facility: CLINIC | Age: 78
End: 2023-11-17
Payer: MEDICARE

## 2023-11-17 VITALS
BODY MASS INDEX: 36.76 KG/M2 | DIASTOLIC BLOOD PRESSURE: 74 MMHG | TEMPERATURE: 98.5 F | HEART RATE: 77 BPM | RESPIRATION RATE: 16 BRPM | SYSTOLIC BLOOD PRESSURE: 107 MMHG | WEIGHT: 201 LBS | OXYGEN SATURATION: 98 %

## 2023-11-17 LAB
BASOPHILS # BLD AUTO: 0.06 K/UL — SIGNIFICANT CHANGE UP (ref 0–0.2)
BASOPHILS # BLD AUTO: 0.06 K/UL — SIGNIFICANT CHANGE UP (ref 0–0.2)
BASOPHILS NFR BLD AUTO: 1 % — SIGNIFICANT CHANGE UP (ref 0–2)
BASOPHILS NFR BLD AUTO: 1 % — SIGNIFICANT CHANGE UP (ref 0–2)
EOSINOPHIL # BLD AUTO: 0.21 K/UL — SIGNIFICANT CHANGE UP (ref 0–0.5)
EOSINOPHIL # BLD AUTO: 0.21 K/UL — SIGNIFICANT CHANGE UP (ref 0–0.5)
EOSINOPHIL NFR BLD AUTO: 3.4 % — SIGNIFICANT CHANGE UP (ref 0–6)
EOSINOPHIL NFR BLD AUTO: 3.4 % — SIGNIFICANT CHANGE UP (ref 0–6)
HCT VFR BLD CALC: 38.6 % — SIGNIFICANT CHANGE UP (ref 34.5–45)
HCT VFR BLD CALC: 38.6 % — SIGNIFICANT CHANGE UP (ref 34.5–45)
HGB BLD-MCNC: 12.9 G/DL — SIGNIFICANT CHANGE UP (ref 11.5–15.5)
HGB BLD-MCNC: 12.9 G/DL — SIGNIFICANT CHANGE UP (ref 11.5–15.5)
IMM GRANULOCYTES NFR BLD AUTO: 1.1 % — HIGH (ref 0–0.9)
IMM GRANULOCYTES NFR BLD AUTO: 1.1 % — HIGH (ref 0–0.9)
LYMPHOCYTES # BLD AUTO: 1 K/UL — SIGNIFICANT CHANGE UP (ref 1–3.3)
LYMPHOCYTES # BLD AUTO: 1 K/UL — SIGNIFICANT CHANGE UP (ref 1–3.3)
LYMPHOCYTES # BLD AUTO: 16.3 % — SIGNIFICANT CHANGE UP (ref 13–44)
LYMPHOCYTES # BLD AUTO: 16.3 % — SIGNIFICANT CHANGE UP (ref 13–44)
MCHC RBC-ENTMCNC: 29.3 PG — SIGNIFICANT CHANGE UP (ref 27–34)
MCHC RBC-ENTMCNC: 29.3 PG — SIGNIFICANT CHANGE UP (ref 27–34)
MCHC RBC-ENTMCNC: 33.4 G/DL — SIGNIFICANT CHANGE UP (ref 32–36)
MCHC RBC-ENTMCNC: 33.4 G/DL — SIGNIFICANT CHANGE UP (ref 32–36)
MCV RBC AUTO: 87.7 FL — SIGNIFICANT CHANGE UP (ref 80–100)
MCV RBC AUTO: 87.7 FL — SIGNIFICANT CHANGE UP (ref 80–100)
MONOCYTES # BLD AUTO: 0.43 K/UL — SIGNIFICANT CHANGE UP (ref 0–0.9)
MONOCYTES # BLD AUTO: 0.43 K/UL — SIGNIFICANT CHANGE UP (ref 0–0.9)
MONOCYTES NFR BLD AUTO: 7 % — SIGNIFICANT CHANGE UP (ref 2–14)
MONOCYTES NFR BLD AUTO: 7 % — SIGNIFICANT CHANGE UP (ref 2–14)
NEUTROPHILS # BLD AUTO: 4.36 K/UL — SIGNIFICANT CHANGE UP (ref 1.8–7.4)
NEUTROPHILS # BLD AUTO: 4.36 K/UL — SIGNIFICANT CHANGE UP (ref 1.8–7.4)
NEUTROPHILS NFR BLD AUTO: 71.2 % — SIGNIFICANT CHANGE UP (ref 43–77)
NEUTROPHILS NFR BLD AUTO: 71.2 % — SIGNIFICANT CHANGE UP (ref 43–77)
NRBC # BLD: 0 /100 WBCS — SIGNIFICANT CHANGE UP (ref 0–0)
NRBC # BLD: 0 /100 WBCS — SIGNIFICANT CHANGE UP (ref 0–0)
PLATELET # BLD AUTO: 263 K/UL — SIGNIFICANT CHANGE UP (ref 150–400)
PLATELET # BLD AUTO: 263 K/UL — SIGNIFICANT CHANGE UP (ref 150–400)
RBC # BLD: 4.4 M/UL — SIGNIFICANT CHANGE UP (ref 3.8–5.2)
RBC # BLD: 4.4 M/UL — SIGNIFICANT CHANGE UP (ref 3.8–5.2)
RBC # FLD: 13.2 % — SIGNIFICANT CHANGE UP (ref 10.3–14.5)
RBC # FLD: 13.2 % — SIGNIFICANT CHANGE UP (ref 10.3–14.5)
WBC # BLD: 6.13 K/UL — SIGNIFICANT CHANGE UP (ref 3.8–10.5)
WBC # BLD: 6.13 K/UL — SIGNIFICANT CHANGE UP (ref 3.8–10.5)
WBC # FLD AUTO: 6.13 K/UL — SIGNIFICANT CHANGE UP (ref 3.8–10.5)
WBC # FLD AUTO: 6.13 K/UL — SIGNIFICANT CHANGE UP (ref 3.8–10.5)

## 2023-11-17 PROCEDURE — 99214 OFFICE O/P EST MOD 30 MIN: CPT

## 2023-11-28 LAB
ALBUMIN SERPL ELPH-MCNC: 4.8 G/DL
ALP BLD-CCNC: 69 U/L
ALT SERPL-CCNC: 16 U/L
ANION GAP SERPL CALC-SCNC: 15 MMOL/L
AST SERPL-CCNC: 22 U/L
BILIRUB SERPL-MCNC: 0.3 MG/DL
BUN SERPL-MCNC: 18 MG/DL
CALCIUM SERPL-MCNC: 9.9 MG/DL
CHLORIDE SERPL-SCNC: 100 MMOL/L
CHOLEST SERPL-MCNC: 174 MG/DL
CO2 SERPL-SCNC: 23 MMOL/L
CREAT SERPL-MCNC: 0.87 MG/DL
EGFR: 69 ML/MIN/1.73M2
ESTIMATED AVERAGE GLUCOSE: 111 MG/DL
GLUCOSE SERPL-MCNC: 89 MG/DL
HBA1C MFR BLD HPLC: 5.5 %
HDLC SERPL-MCNC: 72 MG/DL
LDLC SERPL CALC-MCNC: 81 MG/DL
NONHDLC SERPL-MCNC: 102 MG/DL
POTASSIUM SERPL-SCNC: 5.5 MMOL/L
PROT SERPL-MCNC: 7.5 G/DL
SODIUM SERPL-SCNC: 138 MMOL/L
TRIGL SERPL-MCNC: 118 MG/DL
TSH SERPL-ACNC: 0.97 UIU/ML

## 2023-12-12 ENCOUNTER — OUTPATIENT (OUTPATIENT)
Dept: OUTPATIENT SERVICES | Facility: HOSPITAL | Age: 78
LOS: 1 days | End: 2023-12-12
Payer: MEDICARE

## 2023-12-12 ENCOUNTER — APPOINTMENT (OUTPATIENT)
Dept: MRI IMAGING | Facility: IMAGING CENTER | Age: 78
End: 2023-12-12
Payer: MEDICARE

## 2023-12-12 DIAGNOSIS — Z98.890 OTHER SPECIFIED POSTPROCEDURAL STATES: Chronic | ICD-10-CM

## 2023-12-12 DIAGNOSIS — C50.912 MALIGNANT NEOPLASM OF UNSPECIFIED SITE OF LEFT FEMALE BREAST: ICD-10-CM

## 2023-12-12 DIAGNOSIS — Z90.722 ACQUIRED ABSENCE OF OVARIES, BILATERAL: Chronic | ICD-10-CM

## 2023-12-12 PROCEDURE — 77049 MRI BREAST C-+ W/CAD BI: CPT | Mod: 26

## 2023-12-12 PROCEDURE — C8908: CPT

## 2023-12-12 PROCEDURE — A9585: CPT

## 2023-12-12 PROCEDURE — C8937: CPT

## 2024-01-26 ENCOUNTER — NON-APPOINTMENT (OUTPATIENT)
Age: 79
End: 2024-01-26

## 2024-01-31 ENCOUNTER — APPOINTMENT (OUTPATIENT)
Dept: SURGICAL ONCOLOGY | Facility: CLINIC | Age: 79
End: 2024-01-31
Payer: MEDICARE

## 2024-01-31 ENCOUNTER — RESULT REVIEW (OUTPATIENT)
Age: 79
End: 2024-01-31

## 2024-01-31 VITALS
SYSTOLIC BLOOD PRESSURE: 124 MMHG | BODY MASS INDEX: 36.67 KG/M2 | OXYGEN SATURATION: 97 % | DIASTOLIC BLOOD PRESSURE: 80 MMHG | HEIGHT: 62 IN | WEIGHT: 199.3 LBS | RESPIRATION RATE: 16 BRPM | HEART RATE: 74 BPM

## 2024-01-31 PROCEDURE — 99213 OFFICE O/P EST LOW 20 MIN: CPT

## 2024-01-31 NOTE — HISTORY OF PRESENT ILLNESS
[de-identified] : Ms. Elissa Crowe is a 78-year-old female presents for a follow up visit.   She is s/p left breast magseed localized lumpectomy and left axillary SLNB for left breast cancer on 1/13/2022. Final pathology revealed a 5 mm invasive poorly differentiated ductal carcinoma with prominent chronic inflammatory infiltrate, 2 mm DCIS, cribriform type with high nuclear grade, complex sclerosing lesion, focal lympho-vascular invasion is identified, negative margins, 0/2 lymph nodes.  ER/TX/HER2(-).  Tumor stage: pT1a pN0 ; Triple negative breast cancer.   Completed adjuvant chemo 5/2022 and RT 7/2022  B/L mammo/sono 10/2023: - multiple nodules in central right breast anteriorly  - 2 new nodules to R 9:00 RA and R 8:00 N1  f/u Right U/S in 6 months (4/2024) BI-RADS 3    PERTINENT HISTORY: She has a prior history of a benign excisional biopsy of the left breast in 2013, right breast in 1999 and an additional excisional biopsy with another surgeon at an unspecified time.   Her past medical history includes hypertension, hypothyroidism and hyperlipidemia.  Past surgical history includes a bilateral oophorectomy for a benign condition) and a D&C. She has a family history of breast cancer involving a maternal first cousin at age 62.

## 2024-03-06 ENCOUNTER — OUTPATIENT (OUTPATIENT)
Dept: OUTPATIENT SERVICES | Facility: HOSPITAL | Age: 79
LOS: 1 days | Discharge: HOME | End: 2024-03-06

## 2024-03-06 DIAGNOSIS — Z98.890 OTHER SPECIFIED POSTPROCEDURAL STATES: Chronic | ICD-10-CM

## 2024-03-06 DIAGNOSIS — Z90.722 ACQUIRED ABSENCE OF OVARIES, BILATERAL: Chronic | ICD-10-CM

## 2024-03-06 DIAGNOSIS — D64.9 ANEMIA, UNSPECIFIED: ICD-10-CM

## 2024-03-13 ENCOUNTER — RESULT REVIEW (OUTPATIENT)
Age: 79
End: 2024-03-13

## 2024-03-13 ENCOUNTER — APPOINTMENT (OUTPATIENT)
Dept: HEMATOLOGY ONCOLOGY | Facility: CLINIC | Age: 79
End: 2024-03-13
Payer: MEDICARE

## 2024-03-13 VITALS
BODY MASS INDEX: 35.85 KG/M2 | HEART RATE: 75 BPM | RESPIRATION RATE: 16 BRPM | OXYGEN SATURATION: 95 % | DIASTOLIC BLOOD PRESSURE: 77 MMHG | SYSTOLIC BLOOD PRESSURE: 128 MMHG | WEIGHT: 195.99 LBS | TEMPERATURE: 97.5 F

## 2024-03-13 DIAGNOSIS — C50.912 MALIGNANT NEOPLASM OF UNSPECIFIED SITE OF LEFT FEMALE BREAST: ICD-10-CM

## 2024-03-13 LAB
BASOPHILS # BLD AUTO: 0.05 K/UL — SIGNIFICANT CHANGE UP (ref 0–0.2)
BASOPHILS NFR BLD AUTO: 0.8 % — SIGNIFICANT CHANGE UP (ref 0–2)
EOSINOPHIL # BLD AUTO: 0.12 K/UL — SIGNIFICANT CHANGE UP (ref 0–0.5)
EOSINOPHIL NFR BLD AUTO: 1.9 % — SIGNIFICANT CHANGE UP (ref 0–6)
HCT VFR BLD CALC: 36.5 % — SIGNIFICANT CHANGE UP (ref 34.5–45)
HGB BLD-MCNC: 12.1 G/DL — SIGNIFICANT CHANGE UP (ref 11.5–15.5)
IMM GRANULOCYTES NFR BLD AUTO: 0.2 % — SIGNIFICANT CHANGE UP (ref 0–0.9)
LYMPHOCYTES # BLD AUTO: 1.01 K/UL — SIGNIFICANT CHANGE UP (ref 1–3.3)
LYMPHOCYTES # BLD AUTO: 15.8 % — SIGNIFICANT CHANGE UP (ref 13–44)
MCHC RBC-ENTMCNC: 29.7 PG — SIGNIFICANT CHANGE UP (ref 27–34)
MCHC RBC-ENTMCNC: 33.2 G/DL — SIGNIFICANT CHANGE UP (ref 32–36)
MCV RBC AUTO: 89.7 FL — SIGNIFICANT CHANGE UP (ref 80–100)
MONOCYTES # BLD AUTO: 0.5 K/UL — SIGNIFICANT CHANGE UP (ref 0–0.9)
MONOCYTES NFR BLD AUTO: 7.8 % — SIGNIFICANT CHANGE UP (ref 2–14)
NEUTROPHILS # BLD AUTO: 4.71 K/UL — SIGNIFICANT CHANGE UP (ref 1.8–7.4)
NEUTROPHILS NFR BLD AUTO: 73.5 % — SIGNIFICANT CHANGE UP (ref 43–77)
NRBC # BLD: 0 /100 WBCS — SIGNIFICANT CHANGE UP (ref 0–0)
PLATELET # BLD AUTO: 275 K/UL — SIGNIFICANT CHANGE UP (ref 150–400)
RBC # BLD: 4.07 M/UL — SIGNIFICANT CHANGE UP (ref 3.8–5.2)
RBC # FLD: 13.2 % — SIGNIFICANT CHANGE UP (ref 10.3–14.5)
WBC # BLD: 6.4 K/UL — SIGNIFICANT CHANGE UP (ref 3.8–10.5)
WBC # FLD AUTO: 6.4 K/UL — SIGNIFICANT CHANGE UP (ref 3.8–10.5)

## 2024-03-13 PROCEDURE — G2211 COMPLEX E/M VISIT ADD ON: CPT

## 2024-03-13 PROCEDURE — 99213 OFFICE O/P EST LOW 20 MIN: CPT

## 2024-03-13 NOTE — PHYSICAL EXAM
[Fully active, able to carry on all pre-disease performance without restriction] : Status 0 - Fully active, able to carry on all pre-disease performance without restriction [Normal] : affect appropriate [de-identified] : left healed axillary and lumpectomy scar

## 2024-03-13 NOTE — HISTORY OF PRESENT ILLNESS
[T: ___] : T[unfilled] [N: ___] : N[unfilled] [M: ___] : M[unfilled] [AJCC Stage: ____] : AJCC Stage: [unfilled] [de-identified] : Ms.Ruth Crowe  is a 78 year old female here for an evaluation of breast cancer. Her oncologic history is as follows:  She has a prior history of a benign excisional biopsy of the left breast in 2013, right breast in 1999 and an additional excisional biopsy with another surgeon at an unspecified time. Pathology demonstrated atypia but she has never been diagnosed with cancer.   She underwent routine breast imaging on 5/21/2021(BIRADS 0) which showed  small asymmetry was seen in the central left breast for which additional views are recommended. There are questionable small nodules at the 3:00, 5:00 and 11:00 positions of the left breast on ultrasound for which a targeted ultrasound was recommended   Additional left breast mammo/sono on 5/31/21( BIRADS 3) revealed persistent small nodular asymmetry in the central posterior likely medial left breast by mammography, likely corresponding to a probable cyst at 10:00 on sonography. Follow-up left diagnostic mammography and targeted left breast sonography in 6 months are recommended to confirm stability of these findings.There is an additional small nodule in the upper left breast on mammo, with no sonogram correlate. for which 6 months follow-up left diagnostic mammography is recommended. There are probably benign findings on US at the 5:00 and 11:00 left breast for which follow-up targeted left breast sonography in 6 months is recommended. Left-sided mammogram and sonogram was performed in 11/17/2021( BI-RADS 4B) There is a 3 mm cyst vs.hypoechoic nodule in the left breast at 9:00, 6 cmfn, corresponding to a stable circumscribed nodule on mammogram. This appears somewhat irregular on sonography and ultrasound-guided biopsy is recommended.  She underwent  left breast, 9 o 'clock, ultrasound guided core biopsy core biopsy on 11/19/2021 which showed Invasive poorly differentiated ductal carcinoma with prominent lymphocytic infiltrate,Adrian score 8/9, measuring 0.3 cm, ER:Negative, 0%, PgR:Negative, 0%, HER-2:Negative. No Ductal carcinoma in situ or microcalcifications present No lymphovascular permeation seen. She underwent a breast MRI on  12/02/2021(BI-RADS 4A) which showed recently diagnosed left breast malignancy, a non-mass enhancement in the left outer retroareolar breast, possibly corresponding to a finding seen on ultrasound at 3:00 retroareolar for which targeted left breast ultrasound and ultrasound-guided core needle biopsy is recommended.In addition, in view of the multiple bilateral enhancing foci and the presence of multiple masses on previous ultrasound studies, bilateral ultrasound is recommended at this time, to determine if any other masses in either breast demonstrate indeterminate or suspicious morphology warranting biopsy.  She had BL US on  12/09/2021(BIRADS 6) which showed a left breast 9:00 6 cm from the nipple 0.3 cm irregular shaped hypoechoic mass at site of biopsy-proven invasive cancer. There is a left breast 3:00 retroareolar bilobed hypoechoic nodule/complicated cyst which correlates with the MRI finding for which ultrasound-guided core biopsy is rec. There is a right breast 12:00 1 cm from the nipple 0.3 cm round irregular shaped hypoechoic mass with indistinct margins. for which for ultrasound-guided core biopsy is rec.   She underwent bilateral breasts us core biopsy on 12/9/2021 the right breast 12 o'clock 1 cmfn biopsy revealed proliferative fibrocystic changes with focal moderate to florid usual ductal epithelial hyperplasia,duct ectasia showing two ducts with attenuated epithelial lining,thin fibrotic walls and surrounding mild chronic inflammation The left breast, retroareolar biopsy at 3:00 revealed proliferative fibrocystic changes with moderate to florid usual ductal epithelial hyperplasia, cyst formation and apocrine metaplasia.  She underwent left breast lumpectomy on  1/13/2022 which revealed invasive poorly differentiated ductal carcinoma with prominent chronic inflammatory infiltrate,  [place] grade 3, measuring 0.5 cm, Margin were negative. Small focus of Lymphovascular invasion was noted. 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   She had GENETIC Testing 1/26/22, results pending. She is Ashkenazi L[employer]-day.  I discussed natural history and treatment options for early stage triple negative breast cancer.  Adjuvant chemotherapy is typically recommended for tumor size 6 mm or more. Triple negative breast cancer is aggressive and has a high risk for recurrence therefore chemotherapy can be considered for smaller T1 a tumors as well. She has LVI and therefore chemotherapy can be considered. Patient has very good organ function and performance status despite her age and is very highly motivated to get aggressive treatments. Patient wants to go ahead with the best possible chemotherapy option. I emphasized that Adriamycin based chemotherapy will be an overkill for T1 a tumor as well as will not be tolerable at her age therefore is not recommended. Patient said "age is just a number" and she is not ready to go anytime soon. She is very motivated to start chemotherapy ASAP. Patient reports that she came with an understanding that chemotherapy is on the table.   Patient is very motivated and wants to get aggressive treatment upfront. She is willing to take chemotherapy with a good understanding of risks and benefits. We discussed that dose dense CMF will be appropriate adjuvant chemotherapy regimen for her with tolerable toxicity. Chemotherapy dose and schedule can be modified based on her tolerance as well.  We discussed the data for adjuvant CMF. Adjuvant CMF is equivalent to AC x4 in terms of disease-free survival and overall survival based on randomized clinical trial data, but there is no direct comparison of ddACT or TC with CMF. Dose dense CMF was studied in a small feasibility study and we will follow the same protocol. (Kemi et al, -Clin Breast Cancer. 2010 Dec 1; 10(6): 017004)  <https://www.ncbi.nlm.nih.gov/entrez/eutils/elink.fcgi?dbfrom=pubmed&retmode=ref&cmd=prlinks&we=24569637>.  We discussed expected and unexpected side effects of chemotherapy, including but not limited to hair loss (minimal), fatigue, change in taste, mouth sores, nausea, vomiting, diarrhea, infusion reaction, allergic reaction, significant myelosuppression, need for blood transfusion, infection, bleeding, neuropathy, chemical cystitis, kidney injury, nerve pain, muscle pain and detrimental effect on liver function. Signed an informed consent after complete understanding of the risks, benefits and alternatives. Patient reports she has fatty liver. LFTs today are normal. We will keep an eye on her LFTs during chemotherapy.  She asked other appropriate questions including the role of PARP inhibitors and immunotherapy. We discussed that she is not a candidate for Parp inhibitors in the adjuvant setting given the small disease. BRCA gene testing is pending. We reviewed the role of immunotherapy in the neoadjuvant setting followed by adjuvant setting for large node positive triple negative tumors. She also inquired the role of platinum therapy. We discussed toxicity associated with it and again no benefit in the adjuvant setting. We also discussed role of androgen receptor in triple negative breast cancer patients. We will ask pathology to check androgen receptor although the therapy is not indicated in the adjuvant setting.  She is a candidate for radiation therapy.    PET CT 02/04/2022 Mildly FDG avid amorphous soft tissue in the medial aspect of the left breast and mildly FDG avid soft tissue and low-attenuation density in the left axilla likely represents post surgical changes. Please correlate clinically and with follow-up study. No evidence of metastatic disease. 2. Nonspecific minimally FDG avid nodular soft tissue within the right breast, likely representing post biopsy changes. Correlate clinically 3. Nonspecific hypermetabolism in the right colon with no definite abnormality on CT. Please correlate clinically or with colonoscopy as warranted. Patient recently had colonoscopy she will f/u with GI prn EKG done , Chemo consent obtained  Patient is a anxious about stating chemo today Emotional support given  5/2022 Chemo # 8 CMF onpro today 5/11/22. Tolerating well, occasional lightheadedness D7 last tx most likely 2/2 dehydration. She took BP at home stable.  She reports mild-moderate fatigue and altered taste x 3-4 days after chemo. She was able to function, maintained PO intake, weight stable. She had mild nausea, took Reglan, no vomiting, no diarrhea or mouth sores. No Bone pain, no neuropathy, no fevers She has mild headache, resolved w/o meds. Mild hair thinning  She will start RT in 3-4 weeks RT 4 m. mammo after RT  1/2023 She reports headaches x 1 m, in the morning, dx with retinal issue. Relived with excerderin. Will obtain brain MRI She has back pain, worse than before.  PET 2/2022 reviewed. Will repeat PET scan to f/u on previous findings and to evaluate back pain BW today   8/2/2023 She feels good overall but is beginning to gain weight despite exercise, she is trying to eat healthy, Hair has grown back. she plays cards with her friends weekly. home improvement projects She had covid 4/2023, fevers resolved after 4 days. s/p remdesivir, no residual symptoms no cough or sob now Chemo completed 5/2022. RT completed 7/2022 S/e are completely resolved, no CIPN Appetite good, energy good. Reports ongoing occasional headache relieved with excedrin CTa head- PARVEZ 2/2023l She has gained 30 lbs since chemo and RT, rec to loose wt [de-identified] : Ms. 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 She is s/p lumpectomy on 1/13/2022. Tumor size is 5 mm, 2 sentinel lymph nodes are negative but focal lymphovascular invasion was noted. ddCMF started 2/16/22. PET 2/2022 PARVEZ. BRCA neg  3/2024 She is staying active and eating healthy, on a diet plan, trying to loose wt. Have been slowly losing No new joint pains. No CP SOB headaches PET/CT 2/2022 PARVEZ, 2/2023 PARVEZ. Colonoscopy June 2021 was good.  MRI head, CTA head- PARVEZ 2/2023 (headaches). Mammogram/sono 10/2023 - BiRADS3 (mammo shows stable nodules since 2021 and sono shows 2 nodules (4 and 5 mm) appearing new). Right sono due next month  [5 - Distress Level] : Distress Level: 5 [Patient given social work contact information and resource sheet] : Patient was given social work contact information and resource sheet

## 2024-03-13 NOTE — ASSESSMENT
[FreeTextEntry1] : Ms. 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 She is s/p lumpectomy on 1/13/2022. Tumor size is 5 mm, 2 sentinel lymph nodes are negative but focal lymphovascular invasion was noted. ddCMF started 2/16/22. PET 2/2022 [facility]. BRCA neg. Chemo completed 5/2022. RT completed 7/2022   BREAST CA:  Chemo completed 5/2022. RT completed 7/2022 S/e are completely resolved, no CIPN. Patient has arthritis and back pain not related to disease Also reports ongoing occasional headache relieved with Excedrin CTA head- [facility] 2/2023 DEXA 5/2023 with GYN: Osteopenia rec to continue vit D suppls PET/CT 2/2022 [facility]. Colonoscopy june 2021 was good. PET 2/2023 [facility]. MRI head, CTA head- [facility] 2/2023 ( headaches). Headaches better - No endocrine therapy needed. No role for platinumns, IO or xeloda - Weight gain- ongoing weight gain since stopping chemo, most recently lost 10 lbs, encouraged to continue to lose weight, encourage healthy diet and exercise. - Continue follow up with primary care. I recommend age-appropriate malignancy screening - Educated about s/sx of recurrence - She is one year out and doing well  RTC q3-4 m or sooner if any new sx. [Curative] : Goals of care discussed with patient: Curative

## 2024-03-21 LAB
ALBUMIN SERPL ELPH-MCNC: 4.6 G/DL
ALP BLD-CCNC: 64 U/L
ALT SERPL-CCNC: 11 U/L
ANION GAP SERPL CALC-SCNC: 13 MMOL/L
AST SERPL-CCNC: 16 U/L
BILIRUB SERPL-MCNC: 0.2 MG/DL
BUN SERPL-MCNC: 17 MG/DL
CALCIUM SERPL-MCNC: 9.6 MG/DL
CANCER AG27-29 SERPL-ACNC: 14.7 U/ML
CEA SERPL-MCNC: 1.8 NG/ML
CHLORIDE SERPL-SCNC: 103 MMOL/L
CO2 SERPL-SCNC: 23 MMOL/L
CREAT SERPL-MCNC: 0.84 MG/DL
EGFR: 72 ML/MIN/1.73M2
GLUCOSE SERPL-MCNC: 100 MG/DL
POTASSIUM SERPL-SCNC: 4.7 MMOL/L
PROT SERPL-MCNC: 7 G/DL
SODIUM SERPL-SCNC: 139 MMOL/L

## 2024-04-30 ENCOUNTER — APPOINTMENT (OUTPATIENT)
Dept: ULTRASOUND IMAGING | Facility: CLINIC | Age: 79
End: 2024-04-30
Payer: MEDICARE

## 2024-04-30 ENCOUNTER — RESULT REVIEW (OUTPATIENT)
Age: 79
End: 2024-04-30

## 2024-04-30 PROCEDURE — 76642 ULTRASOUND BREAST LIMITED: CPT | Mod: RT

## 2024-07-15 ENCOUNTER — OUTPATIENT (OUTPATIENT)
Dept: OUTPATIENT SERVICES | Facility: HOSPITAL | Age: 79
LOS: 1 days | Discharge: HOME | End: 2024-07-15

## 2024-07-15 DIAGNOSIS — Z98.890 OTHER SPECIFIED POSTPROCEDURAL STATES: Chronic | ICD-10-CM

## 2024-07-15 DIAGNOSIS — Z90.722 ACQUIRED ABSENCE OF OVARIES, BILATERAL: Chronic | ICD-10-CM

## 2024-07-15 DIAGNOSIS — D64.9 ANEMIA, UNSPECIFIED: ICD-10-CM

## 2024-07-16 ENCOUNTER — NON-APPOINTMENT (OUTPATIENT)
Age: 79
End: 2024-07-16

## 2024-07-17 ENCOUNTER — RESULT REVIEW (OUTPATIENT)
Age: 79
End: 2024-07-17

## 2024-07-17 ENCOUNTER — APPOINTMENT (OUTPATIENT)
Dept: HEMATOLOGY ONCOLOGY | Facility: CLINIC | Age: 79
End: 2024-07-17
Payer: MEDICARE

## 2024-07-17 VITALS
HEART RATE: 75 BPM | RESPIRATION RATE: 17 BRPM | DIASTOLIC BLOOD PRESSURE: 67 MMHG | WEIGHT: 200.62 LBS | SYSTOLIC BLOOD PRESSURE: 97 MMHG | BODY MASS INDEX: 36.69 KG/M2 | TEMPERATURE: 97.3 F | OXYGEN SATURATION: 98 %

## 2024-07-17 DIAGNOSIS — C50.912 MALIGNANT NEOPLASM OF UNSPECIFIED SITE OF LEFT FEMALE BREAST: ICD-10-CM

## 2024-07-17 LAB
BASOPHILS # BLD AUTO: 0.08 K/UL — SIGNIFICANT CHANGE UP (ref 0–0.2)
BASOPHILS NFR BLD AUTO: 1.3 % — SIGNIFICANT CHANGE UP (ref 0–2)
EOSINOPHIL # BLD AUTO: 0.19 K/UL — SIGNIFICANT CHANGE UP (ref 0–0.5)
EOSINOPHIL NFR BLD AUTO: 3 % — SIGNIFICANT CHANGE UP (ref 0–6)
HCT VFR BLD CALC: 35.4 % — SIGNIFICANT CHANGE UP (ref 34.5–45)
HGB BLD-MCNC: 11.3 G/DL — LOW (ref 11.5–15.5)
IMM GRANULOCYTES NFR BLD AUTO: 0.2 % — SIGNIFICANT CHANGE UP (ref 0–0.9)
LYMPHOCYTES # BLD AUTO: 1.12 K/UL — SIGNIFICANT CHANGE UP (ref 1–3.3)
LYMPHOCYTES # BLD AUTO: 17.9 % — SIGNIFICANT CHANGE UP (ref 13–44)
MCHC RBC-ENTMCNC: 29.1 PG — SIGNIFICANT CHANGE UP (ref 27–34)
MCHC RBC-ENTMCNC: 31.9 G/DL — LOW (ref 32–36)
MCV RBC AUTO: 91.2 FL — SIGNIFICANT CHANGE UP (ref 80–100)
MONOCYTES # BLD AUTO: 0.49 K/UL — SIGNIFICANT CHANGE UP (ref 0–0.9)
MONOCYTES NFR BLD AUTO: 7.8 % — SIGNIFICANT CHANGE UP (ref 2–14)
NEUTROPHILS # BLD AUTO: 4.38 K/UL — SIGNIFICANT CHANGE UP (ref 1.8–7.4)
NEUTROPHILS NFR BLD AUTO: 69.8 % — SIGNIFICANT CHANGE UP (ref 43–77)
NRBC # BLD: 0 /100 WBCS — SIGNIFICANT CHANGE UP (ref 0–0)
NRBC BLD-RTO: 0 /100 WBCS — SIGNIFICANT CHANGE UP (ref 0–0)
PLATELET # BLD AUTO: 317 K/UL — SIGNIFICANT CHANGE UP (ref 150–400)
RBC # BLD: 3.88 M/UL — SIGNIFICANT CHANGE UP (ref 3.8–5.2)
RBC # FLD: 14.3 % — SIGNIFICANT CHANGE UP (ref 10.3–14.5)
WBC # BLD: 6.27 K/UL — SIGNIFICANT CHANGE UP (ref 3.8–10.5)
WBC # FLD AUTO: 6.27 K/UL — SIGNIFICANT CHANGE UP (ref 3.8–10.5)

## 2024-07-17 PROCEDURE — G2211 COMPLEX E/M VISIT ADD ON: CPT

## 2024-07-17 PROCEDURE — 99213 OFFICE O/P EST LOW 20 MIN: CPT

## 2024-07-25 LAB
ALBUMIN SERPL ELPH-MCNC: 4.2 G/DL
ALP BLD-CCNC: 64 U/L
ALT SERPL-CCNC: 61 U/L
ANION GAP SERPL CALC-SCNC: 13 MMOL/L
AST SERPL-CCNC: 38 U/L
BILIRUB SERPL-MCNC: 0.2 MG/DL
BUN SERPL-MCNC: 18 MG/DL
CALCIUM SERPL-MCNC: 9 MG/DL
CANCER AG27-29 SERPL-ACNC: 15.3 U/ML
CEA SERPL-MCNC: 2 NG/ML
CHLORIDE SERPL-SCNC: 106 MMOL/L
CO2 SERPL-SCNC: 23 MMOL/L
CREAT SERPL-MCNC: 0.78 MG/DL
EGFR: 78 ML/MIN/1.73M2
GLUCOSE SERPL-MCNC: 110 MG/DL
POTASSIUM SERPL-SCNC: 4.7 MMOL/L
PROT SERPL-MCNC: 6.6 G/DL
SODIUM SERPL-SCNC: 142 MMOL/L

## 2024-10-29 ENCOUNTER — APPOINTMENT (OUTPATIENT)
Dept: MAMMOGRAPHY | Facility: CLINIC | Age: 79
End: 2024-10-29
Payer: MEDICARE

## 2024-10-29 ENCOUNTER — RESULT REVIEW (OUTPATIENT)
Age: 79
End: 2024-10-29

## 2024-10-29 ENCOUNTER — APPOINTMENT (OUTPATIENT)
Dept: ULTRASOUND IMAGING | Facility: CLINIC | Age: 79
End: 2024-10-29
Payer: MEDICARE

## 2024-10-29 PROCEDURE — 77066 DX MAMMO INCL CAD BI: CPT

## 2024-10-29 PROCEDURE — G0279: CPT

## 2024-10-29 PROCEDURE — 76641 ULTRASOUND BREAST COMPLETE: CPT | Mod: 50,GA

## 2024-11-01 DIAGNOSIS — C50.912 MALIGNANT NEOPLASM OF UNSPECIFIED SITE OF LEFT FEMALE BREAST: ICD-10-CM

## 2024-11-20 ENCOUNTER — OUTPATIENT (OUTPATIENT)
Dept: OUTPATIENT SERVICES | Facility: HOSPITAL | Age: 79
LOS: 1 days | End: 2024-11-20
Payer: MEDICARE

## 2024-11-20 ENCOUNTER — APPOINTMENT (OUTPATIENT)
Dept: MRI IMAGING | Facility: IMAGING CENTER | Age: 79
End: 2024-11-20

## 2024-11-20 DIAGNOSIS — Z98.890 OTHER SPECIFIED POSTPROCEDURAL STATES: Chronic | ICD-10-CM

## 2024-11-20 DIAGNOSIS — C50.912 MALIGNANT NEOPLASM OF UNSPECIFIED SITE OF LEFT FEMALE BREAST: ICD-10-CM

## 2024-11-20 DIAGNOSIS — Z90.722 ACQUIRED ABSENCE OF OVARIES, BILATERAL: Chronic | ICD-10-CM

## 2024-11-20 PROCEDURE — 74183 MRI ABD W/O CNTR FLWD CNTR: CPT | Mod: 26,MH

## 2024-11-20 PROCEDURE — 74183 MRI ABD W/O CNTR FLWD CNTR: CPT

## 2024-12-11 ENCOUNTER — NON-APPOINTMENT (OUTPATIENT)
Age: 79
End: 2024-12-11

## 2025-01-21 ENCOUNTER — OUTPATIENT (OUTPATIENT)
Dept: OUTPATIENT SERVICES | Facility: HOSPITAL | Age: 80
LOS: 1 days | Discharge: HOME | End: 2025-01-21

## 2025-01-21 DIAGNOSIS — Z98.890 OTHER SPECIFIED POSTPROCEDURAL STATES: Chronic | ICD-10-CM

## 2025-01-21 DIAGNOSIS — Z90.722 ACQUIRED ABSENCE OF OVARIES, BILATERAL: Chronic | ICD-10-CM

## 2025-01-21 DIAGNOSIS — D64.9 ANEMIA, UNSPECIFIED: ICD-10-CM

## 2025-01-22 ENCOUNTER — RESULT REVIEW (OUTPATIENT)
Age: 80
End: 2025-01-22

## 2025-01-22 ENCOUNTER — APPOINTMENT (OUTPATIENT)
Dept: HEMATOLOGY ONCOLOGY | Facility: CLINIC | Age: 80
End: 2025-01-22
Payer: MEDICARE

## 2025-01-22 VITALS
SYSTOLIC BLOOD PRESSURE: 121 MMHG | HEART RATE: 80 BPM | OXYGEN SATURATION: 98 % | WEIGHT: 196.63 LBS | TEMPERATURE: 97.2 F | RESPIRATION RATE: 16 BRPM | BODY MASS INDEX: 35.96 KG/M2 | DIASTOLIC BLOOD PRESSURE: 82 MMHG

## 2025-01-22 LAB
BASOPHILS # BLD AUTO: 0.07 K/UL — SIGNIFICANT CHANGE UP (ref 0–0.2)
BASOPHILS NFR BLD AUTO: 1.1 % — SIGNIFICANT CHANGE UP (ref 0–2)
EOSINOPHIL # BLD AUTO: 0.14 K/UL — SIGNIFICANT CHANGE UP (ref 0–0.5)
EOSINOPHIL NFR BLD AUTO: 2.1 % — SIGNIFICANT CHANGE UP (ref 0–6)
HCT VFR BLD CALC: 41.3 % — SIGNIFICANT CHANGE UP (ref 34.5–45)
HGB BLD-MCNC: 13.5 G/DL — SIGNIFICANT CHANGE UP (ref 11.5–15.5)
IMM GRANULOCYTES NFR BLD AUTO: 0.6 % — SIGNIFICANT CHANGE UP (ref 0–0.9)
LYMPHOCYTES # BLD AUTO: 1.19 K/UL — SIGNIFICANT CHANGE UP (ref 1–3.3)
LYMPHOCYTES # BLD AUTO: 17.9 % — SIGNIFICANT CHANGE UP (ref 13–44)
MCHC RBC-ENTMCNC: 29.5 PG — SIGNIFICANT CHANGE UP (ref 27–34)
MCHC RBC-ENTMCNC: 32.7 G/DL — SIGNIFICANT CHANGE UP (ref 32–36)
MCV RBC AUTO: 90.2 FL — SIGNIFICANT CHANGE UP (ref 80–100)
MONOCYTES # BLD AUTO: 0.51 K/UL — SIGNIFICANT CHANGE UP (ref 0–0.9)
MONOCYTES NFR BLD AUTO: 7.7 % — SIGNIFICANT CHANGE UP (ref 2–14)
NEUTROPHILS # BLD AUTO: 4.68 K/UL — SIGNIFICANT CHANGE UP (ref 1.8–7.4)
NEUTROPHILS NFR BLD AUTO: 70.6 % — SIGNIFICANT CHANGE UP (ref 43–77)
NRBC # BLD: 0 /100 WBCS — SIGNIFICANT CHANGE UP (ref 0–0)
NRBC BLD-RTO: 0 /100 WBCS — SIGNIFICANT CHANGE UP (ref 0–0)
PLATELET # BLD AUTO: 268 K/UL — SIGNIFICANT CHANGE UP (ref 150–400)
RBC # BLD: 4.58 M/UL — SIGNIFICANT CHANGE UP (ref 3.8–5.2)
RBC # FLD: 12.8 % — SIGNIFICANT CHANGE UP (ref 10.3–14.5)
WBC # BLD: 6.63 K/UL — SIGNIFICANT CHANGE UP (ref 3.8–10.5)
WBC # FLD AUTO: 6.63 K/UL — SIGNIFICANT CHANGE UP (ref 3.8–10.5)

## 2025-01-22 PROCEDURE — 99213 OFFICE O/P EST LOW 20 MIN: CPT

## 2025-01-22 PROCEDURE — G2211 COMPLEX E/M VISIT ADD ON: CPT

## 2025-01-30 ENCOUNTER — NON-APPOINTMENT (OUTPATIENT)
Age: 80
End: 2025-01-30

## 2025-01-30 DIAGNOSIS — C50.912 MALIGNANT NEOPLASM OF UNSPECIFIED SITE OF LEFT FEMALE BREAST: ICD-10-CM

## 2025-01-30 LAB
ALBUMIN SERPL ELPH-MCNC: 4.7 G/DL
ALP BLD-CCNC: 53 U/L
ALT SERPL-CCNC: 14 U/L
ANION GAP SERPL CALC-SCNC: 13 MMOL/L
AST SERPL-CCNC: 21 U/L
BILIRUB SERPL-MCNC: 0.3 MG/DL
BUN SERPL-MCNC: 23 MG/DL
CALCIUM SERPL-MCNC: 9.6 MG/DL
CHLORIDE SERPL-SCNC: 103 MMOL/L
CO2 SERPL-SCNC: 22 MMOL/L
CREAT SERPL-MCNC: 0.81 MG/DL
EGFR: 74 ML/MIN/1.73M2
GLUCOSE SERPL-MCNC: 102 MG/DL
POTASSIUM SERPL-SCNC: 5 MMOL/L
PROT SERPL-MCNC: 7.4 G/DL
SODIUM SERPL-SCNC: 138 MMOL/L

## 2025-02-11 ENCOUNTER — NON-APPOINTMENT (OUTPATIENT)
Age: 80
End: 2025-02-11

## 2025-02-11 ENCOUNTER — APPOINTMENT (OUTPATIENT)
Dept: SURGICAL ONCOLOGY | Facility: CLINIC | Age: 80
End: 2025-02-11
Payer: MEDICARE

## 2025-02-11 VITALS
OXYGEN SATURATION: 97 % | BODY MASS INDEX: 35.88 KG/M2 | HEART RATE: 71 BPM | HEIGHT: 62 IN | DIASTOLIC BLOOD PRESSURE: 78 MMHG | RESPIRATION RATE: 17 BRPM | WEIGHT: 195 LBS | SYSTOLIC BLOOD PRESSURE: 144 MMHG

## 2025-02-11 DIAGNOSIS — C50.912 MALIGNANT NEOPLASM OF UNSPECIFIED SITE OF LEFT FEMALE BREAST: ICD-10-CM

## 2025-02-11 PROCEDURE — 99213 OFFICE O/P EST LOW 20 MIN: CPT

## 2025-06-13 ENCOUNTER — APPOINTMENT (OUTPATIENT)
Dept: OBGYN | Facility: CLINIC | Age: 80
End: 2025-06-13
Payer: MEDICARE

## 2025-06-13 ENCOUNTER — NON-APPOINTMENT (OUTPATIENT)
Age: 80
End: 2025-06-13

## 2025-06-13 VITALS
WEIGHT: 190 LBS | HEIGHT: 62 IN | SYSTOLIC BLOOD PRESSURE: 125 MMHG | BODY MASS INDEX: 34.96 KG/M2 | DIASTOLIC BLOOD PRESSURE: 75 MMHG

## 2025-06-13 PROBLEM — Z90.722 S/P BSO (BILATERAL SALPINGO-OOPHORECTOMY): Status: ACTIVE | Noted: 2025-06-13

## 2025-06-13 PROCEDURE — G0328 FECAL BLOOD SCRN IMMUNOASSAY: CPT | Mod: QW

## 2025-06-13 PROCEDURE — G0101: CPT

## 2025-06-13 PROCEDURE — 77080 DXA BONE DENSITY AXIAL: CPT

## 2025-06-16 LAB — HPV HIGH+LOW RISK DNA PNL CVX: NOT DETECTED

## 2025-06-18 LAB — CYTOLOGY CVX/VAG DOC THIN PREP: ABNORMAL

## 2025-07-28 ENCOUNTER — APPOINTMENT (OUTPATIENT)
Dept: HEMATOLOGY ONCOLOGY | Facility: CLINIC | Age: 80
End: 2025-07-28
Payer: MEDICARE

## 2025-07-28 ENCOUNTER — RESULT REVIEW (OUTPATIENT)
Age: 80
End: 2025-07-28

## 2025-07-28 VITALS
TEMPERATURE: 97.4 F | RESPIRATION RATE: 17 BRPM | OXYGEN SATURATION: 99 % | BODY MASS INDEX: 35.75 KG/M2 | HEIGHT: 61.42 IN | HEART RATE: 76 BPM | SYSTOLIC BLOOD PRESSURE: 131 MMHG | WEIGHT: 191.8 LBS | DIASTOLIC BLOOD PRESSURE: 84 MMHG

## 2025-07-28 DIAGNOSIS — C50.912 MALIGNANT NEOPLASM OF UNSPECIFIED SITE OF LEFT FEMALE BREAST: ICD-10-CM

## 2025-07-28 PROCEDURE — 99213 OFFICE O/P EST LOW 20 MIN: CPT

## 2025-07-28 PROCEDURE — G2211 COMPLEX E/M VISIT ADD ON: CPT

## 2025-07-29 LAB
ALBUMIN SERPL ELPH-MCNC: 4.6 G/DL
ALP BLD-CCNC: 53 U/L
ALT SERPL-CCNC: 14 U/L
ANION GAP SERPL CALC-SCNC: 12 MMOL/L
AST SERPL-CCNC: 19 U/L
BILIRUB SERPL-MCNC: 0.2 MG/DL
BUN SERPL-MCNC: 15 MG/DL
CALCIUM SERPL-MCNC: 9.9 MG/DL
CANCER AG27-29 SERPL-ACNC: 15 U/ML
CEA SERPL-MCNC: 1.8 NG/ML
CHLORIDE SERPL-SCNC: 104 MMOL/L
CO2 SERPL-SCNC: 21 MMOL/L
CREAT SERPL-MCNC: 0.85 MG/DL
EGFRCR SERPLBLD CKD-EPI 2021: 70 ML/MIN/1.73M2
GLUCOSE SERPL-MCNC: 99 MG/DL
POTASSIUM SERPL-SCNC: 4.7 MMOL/L
PROT SERPL-MCNC: 6.8 G/DL
SODIUM SERPL-SCNC: 138 MMOL/L

## (undated) DEVICE — DRSG MAMMARY SUPPORT MED SIZE 2

## (undated) DEVICE — DRSG MAMMARY SUPPORT MED SIZE 3

## (undated) DEVICE — LAP PAD W RING 18 X 18"

## (undated) DEVICE — SUT VICRYL 3-0 27" SH UNDYED

## (undated) DEVICE — ELCTR GROUNDING PAD ADULT COVIDIEN

## (undated) DEVICE — DRAPE TOWEL BLUE 17" X 24"

## (undated) DEVICE — NDL HYPO SAFE 25G X 1" (ORANGE)

## (undated) DEVICE — GOWN LG

## (undated) DEVICE — DRSG STERISTRIPS 0.5 X 4"

## (undated) DEVICE — RADIOGRAPHY DVC SPEC TRANSPEC

## (undated) DEVICE — SOL IRR POUR NS 0.9% 500ML

## (undated) DEVICE — DRSG MAMMARY SUPPORT LG SIZE 4

## (undated) DEVICE — GLV 7.5 PROTEXIS (WHITE)

## (undated) DEVICE — SUT SILK 2-0 30" SH

## (undated) DEVICE — DRAPE CHEST BREAST 106" X 122"

## (undated) DEVICE — WARMING BLANKET LOWER ADULT

## (undated) DEVICE — POSITIONER STRAP ARMBOARD VELCRO TS-30

## (undated) DEVICE — DRSG TEGADERM 4X4.75"

## (undated) DEVICE — SUT MONOCRYL 4-0 27" PS-2 UNDYED

## (undated) DEVICE — DRSG MAMMARY SUPPORT XL SIZE 5

## (undated) DEVICE — ELCTR BOVIE TIP BLADE INSULATED 2.75" EDGE

## (undated) DEVICE — VENODYNE/SCD SLEEVE CALF MEDIUM

## (undated) DEVICE — DRSG MAMMARY SUPPORT SM SIZE 1